# Patient Record
Sex: FEMALE | Race: BLACK OR AFRICAN AMERICAN | NOT HISPANIC OR LATINO | Employment: OTHER | URBAN - METROPOLITAN AREA
[De-identification: names, ages, dates, MRNs, and addresses within clinical notes are randomized per-mention and may not be internally consistent; named-entity substitution may affect disease eponyms.]

---

## 2019-09-23 ENCOUNTER — HOSPITAL ENCOUNTER (INPATIENT)
Facility: HOSPITAL | Age: 64
LOS: 3 days | Discharge: HOME | DRG: 436 | End: 2019-09-26
Attending: EMERGENCY MEDICINE | Admitting: HOSPITALIST
Payer: COMMERCIAL

## 2019-09-23 DIAGNOSIS — C22.0 HCC (HEPATOCELLULAR CARCINOMA): Primary | ICD-10-CM

## 2019-09-23 DIAGNOSIS — R18.0 MALIGNANT ASCITES: ICD-10-CM

## 2019-09-23 PROBLEM — E11.9 TYPE 2 DIABETES MELLITUS, WITHOUT LONG-TERM CURRENT USE OF INSULIN: Status: ACTIVE | Noted: 2019-09-23

## 2019-09-23 PROBLEM — R18.8 ASCITES: Status: ACTIVE | Noted: 2019-09-23

## 2019-09-23 PROBLEM — I10 HYPERTENSION: Status: ACTIVE | Noted: 2019-09-23

## 2019-09-23 LAB
ALBUMIN SERPL BCP-MCNC: 3.1 G/DL (ref 3.5–5.2)
ALP SERPL-CCNC: 288 U/L (ref 55–135)
ALT SERPL W/O P-5'-P-CCNC: 59 U/L (ref 10–44)
AMMONIA PLAS-SCNC: 41 UMOL/L (ref 10–50)
ANION GAP SERPL CALC-SCNC: 15 MMOL/L (ref 8–16)
ANISOCYTOSIS BLD QL SMEAR: ABNORMAL
APTT BLDCRRT: 21.8 SEC (ref 21–32)
AST SERPL-CCNC: 334 U/L (ref 10–40)
BACTERIA #/AREA URNS AUTO: ABNORMAL /HPF
BASOPHILS # BLD AUTO: 0.03 K/UL (ref 0–0.2)
BASOPHILS NFR BLD: 0.3 % (ref 0–1.9)
BILIRUB SERPL-MCNC: 13.8 MG/DL (ref 0.1–1)
BILIRUB UR QL STRIP: ABNORMAL
BUN SERPL-MCNC: 7 MG/DL (ref 8–23)
BURR CELLS BLD QL SMEAR: ABNORMAL
CALCIUM SERPL-MCNC: 9.3 MG/DL (ref 8.7–10.5)
CHLORIDE SERPL-SCNC: 101 MMOL/L (ref 95–110)
CLARITY UR REFRACT.AUTO: CLEAR
CO2 SERPL-SCNC: 23 MMOL/L (ref 23–29)
COLOR UR AUTO: ABNORMAL
CREAT SERPL-MCNC: 0.7 MG/DL (ref 0.5–1.4)
DACRYOCYTES BLD QL SMEAR: ABNORMAL
DIFFERENTIAL METHOD: ABNORMAL
EOSINOPHIL # BLD AUTO: 0.1 K/UL (ref 0–0.5)
EOSINOPHIL NFR BLD: 0.8 % (ref 0–8)
ERYTHROCYTE [DISTWIDTH] IN BLOOD BY AUTOMATED COUNT: 37.2 % (ref 11.5–14.5)
EST. GFR  (AFRICAN AMERICAN): >60 ML/MIN/1.73 M^2
EST. GFR  (NON AFRICAN AMERICAN): >60 ML/MIN/1.73 M^2
ESTIMATED AVG GLUCOSE: 77 MG/DL (ref 68–131)
ESTIMATED AVG GLUCOSE: 80 MG/DL (ref 68–131)
GLUCOSE SERPL-MCNC: 59 MG/DL (ref 70–110)
GLUCOSE UR QL STRIP: NEGATIVE
HBA1C MFR BLD HPLC: 4.3 % (ref 4–5.6)
HBA1C MFR BLD HPLC: 4.4 % (ref 4–5.6)
HCT VFR BLD AUTO: 27 % (ref 37–48.5)
HGB BLD-MCNC: 8 G/DL (ref 12–16)
HGB UR QL STRIP: NEGATIVE
HYALINE CASTS UR QL AUTO: 0 /LPF
HYPOCHROMIA BLD QL SMEAR: ABNORMAL
IMM GRANULOCYTES # BLD AUTO: 0.15 K/UL (ref 0–0.04)
IMM GRANULOCYTES NFR BLD AUTO: 1.5 % (ref 0–0.5)
INR PPP: 1.4 (ref 0.8–1.2)
KETONES UR QL STRIP: ABNORMAL
LEUKOCYTE ESTERASE UR QL STRIP: NEGATIVE
LIPASE SERPL-CCNC: 24 U/L (ref 4–60)
LYMPHOCYTES # BLD AUTO: 1.3 K/UL (ref 1–4.8)
LYMPHOCYTES NFR BLD: 12.9 % (ref 18–48)
MAGNESIUM SERPL-MCNC: 2.1 MG/DL (ref 1.6–2.6)
MCH RBC QN AUTO: 20.2 PG (ref 27–31)
MCHC RBC AUTO-ENTMCNC: 29.6 G/DL (ref 32–36)
MCV RBC AUTO: 68 FL (ref 82–98)
MICROSCOPIC COMMENT: ABNORMAL
MONOCYTES # BLD AUTO: 0.6 K/UL (ref 0.3–1)
MONOCYTES NFR BLD: 5.8 % (ref 4–15)
NEUTROPHILS # BLD AUTO: 7.7 K/UL (ref 1.8–7.7)
NEUTROPHILS NFR BLD: 78.7 % (ref 38–73)
NITRITE UR QL STRIP: NEGATIVE
NRBC BLD-RTO: 0 /100 WBC
PH UR STRIP: 5 [PH] (ref 5–8)
PLATELET # BLD AUTO: 529 K/UL (ref 150–350)
PLATELET BLD QL SMEAR: ABNORMAL
PMV BLD AUTO: ABNORMAL FL (ref 9.2–12.9)
POCT GLUCOSE: 60 MG/DL (ref 70–110)
POIKILOCYTOSIS BLD QL SMEAR: ABNORMAL
POLYCHROMASIA BLD QL SMEAR: ABNORMAL
POTASSIUM SERPL-SCNC: 3.4 MMOL/L (ref 3.5–5.1)
PROT SERPL-MCNC: 8.1 G/DL (ref 6–8.4)
PROT UR QL STRIP: ABNORMAL
PROTHROMBIN TIME: 14.3 SEC (ref 9–12.5)
RBC # BLD AUTO: 3.97 M/UL (ref 4–5.4)
RBC #/AREA URNS AUTO: 2 /HPF (ref 0–4)
SODIUM SERPL-SCNC: 139 MMOL/L (ref 136–145)
SODIUM UR-SCNC: 38 MMOL/L (ref 20–250)
SP GR UR STRIP: 1.02 (ref 1–1.03)
SQUAMOUS #/AREA URNS AUTO: 11 /HPF
TARGETS BLD QL SMEAR: ABNORMAL
URN SPEC COLLECT METH UR: ABNORMAL
WBC # BLD AUTO: 9.79 K/UL (ref 3.9–12.7)
WBC #/AREA URNS AUTO: 3 /HPF (ref 0–5)

## 2019-09-23 PROCEDURE — 85730 THROMBOPLASTIN TIME PARTIAL: CPT

## 2019-09-23 PROCEDURE — 63600175 PHARM REV CODE 636 W HCPCS: Performed by: STUDENT IN AN ORGANIZED HEALTH CARE EDUCATION/TRAINING PROGRAM

## 2019-09-23 PROCEDURE — 80053 COMPREHEN METABOLIC PANEL: CPT

## 2019-09-23 PROCEDURE — 99284 EMERGENCY DEPT VISIT MOD MDM: CPT | Mod: ,,, | Performed by: EMERGENCY MEDICINE

## 2019-09-23 PROCEDURE — 99284 PR EMERGENCY DEPT VISIT,LEVEL IV: ICD-10-PCS | Mod: ,,, | Performed by: EMERGENCY MEDICINE

## 2019-09-23 PROCEDURE — 83036 HEMOGLOBIN GLYCOSYLATED A1C: CPT | Mod: 91

## 2019-09-23 PROCEDURE — 99285 EMERGENCY DEPT VISIT HI MDM: CPT | Mod: 25

## 2019-09-23 PROCEDURE — 63600175 PHARM REV CODE 636 W HCPCS: Performed by: EMERGENCY MEDICINE

## 2019-09-23 PROCEDURE — 85025 COMPLETE CBC W/AUTO DIFF WBC: CPT

## 2019-09-23 PROCEDURE — 85610 PROTHROMBIN TIME: CPT

## 2019-09-23 PROCEDURE — 82140 ASSAY OF AMMONIA: CPT

## 2019-09-23 PROCEDURE — 12000002 HC ACUTE/MED SURGE SEMI-PRIVATE ROOM

## 2019-09-23 PROCEDURE — 81001 URINALYSIS AUTO W/SCOPE: CPT

## 2019-09-23 PROCEDURE — 83735 ASSAY OF MAGNESIUM: CPT

## 2019-09-23 PROCEDURE — 20600001 HC STEP DOWN PRIVATE ROOM

## 2019-09-23 PROCEDURE — 83690 ASSAY OF LIPASE: CPT

## 2019-09-23 PROCEDURE — 25000003 PHARM REV CODE 250: Performed by: STUDENT IN AN ORGANIZED HEALTH CARE EDUCATION/TRAINING PROGRAM

## 2019-09-23 PROCEDURE — 84300 ASSAY OF URINE SODIUM: CPT

## 2019-09-23 RX ORDER — INSULIN ASPART 100 [IU]/ML
0-5 INJECTION, SOLUTION INTRAVENOUS; SUBCUTANEOUS
Status: DISCONTINUED | OUTPATIENT
Start: 2019-09-23 | End: 2019-09-26 | Stop reason: HOSPADM

## 2019-09-23 RX ORDER — GLUCAGON 1 MG
1 KIT INJECTION
Status: DISCONTINUED | OUTPATIENT
Start: 2019-09-23 | End: 2019-09-26 | Stop reason: HOSPADM

## 2019-09-23 RX ORDER — MORPHINE SULFATE 4 MG/ML
4 INJECTION, SOLUTION INTRAMUSCULAR; INTRAVENOUS
Status: COMPLETED | OUTPATIENT
Start: 2019-09-23 | End: 2019-09-23

## 2019-09-23 RX ORDER — ONDANSETRON 2 MG/ML
4 INJECTION INTRAMUSCULAR; INTRAVENOUS
Status: COMPLETED | OUTPATIENT
Start: 2019-09-23 | End: 2019-09-23

## 2019-09-23 RX ORDER — MORPHINE SULFATE 2 MG/ML
2 INJECTION, SOLUTION INTRAMUSCULAR; INTRAVENOUS EVERY 4 HOURS PRN
Status: DISCONTINUED | OUTPATIENT
Start: 2019-09-23 | End: 2019-09-25

## 2019-09-23 RX ORDER — SODIUM CHLORIDE 0.9 % (FLUSH) 0.9 %
10 SYRINGE (ML) INJECTION
Status: DISCONTINUED | OUTPATIENT
Start: 2019-09-23 | End: 2019-09-26 | Stop reason: HOSPADM

## 2019-09-23 RX ORDER — SPIRONOLACTONE 25 MG/1
25 TABLET ORAL DAILY
Status: DISCONTINUED | OUTPATIENT
Start: 2019-09-23 | End: 2019-09-26 | Stop reason: HOSPADM

## 2019-09-23 RX ORDER — IBUPROFEN 200 MG
24 TABLET ORAL
Status: DISCONTINUED | OUTPATIENT
Start: 2019-09-23 | End: 2019-09-26 | Stop reason: HOSPADM

## 2019-09-23 RX ORDER — ONDANSETRON 2 MG/ML
4 INJECTION INTRAMUSCULAR; INTRAVENOUS EVERY 8 HOURS PRN
Status: DISCONTINUED | OUTPATIENT
Start: 2019-09-23 | End: 2019-09-26 | Stop reason: HOSPADM

## 2019-09-23 RX ORDER — FUROSEMIDE 10 MG/ML
40 INJECTION INTRAMUSCULAR; INTRAVENOUS ONCE
Status: COMPLETED | OUTPATIENT
Start: 2019-09-23 | End: 2019-09-23

## 2019-09-23 RX ORDER — IBUPROFEN 200 MG
16 TABLET ORAL
Status: DISCONTINUED | OUTPATIENT
Start: 2019-09-23 | End: 2019-09-26 | Stop reason: HOSPADM

## 2019-09-23 RX ORDER — CEFTRIAXONE 1 G/1
1 INJECTION, POWDER, FOR SOLUTION INTRAMUSCULAR; INTRAVENOUS
Status: COMPLETED | OUTPATIENT
Start: 2019-09-23 | End: 2019-09-23

## 2019-09-23 RX ORDER — ENOXAPARIN SODIUM 100 MG/ML
40 INJECTION SUBCUTANEOUS EVERY 24 HOURS
Status: DISCONTINUED | OUTPATIENT
Start: 2019-09-24 | End: 2019-09-26 | Stop reason: HOSPADM

## 2019-09-23 RX ORDER — CEFTRIAXONE 1 G/1
1 INJECTION, POWDER, FOR SOLUTION INTRAMUSCULAR; INTRAVENOUS
Status: DISCONTINUED | OUTPATIENT
Start: 2019-09-24 | End: 2019-09-24

## 2019-09-23 RX ORDER — PROCHLORPERAZINE EDISYLATE 5 MG/ML
10 INJECTION INTRAMUSCULAR; INTRAVENOUS EVERY 6 HOURS PRN
Status: DISCONTINUED | OUTPATIENT
Start: 2019-09-23 | End: 2019-09-26 | Stop reason: HOSPADM

## 2019-09-23 RX ADMIN — CEFTRIAXONE SODIUM 1 G: 1 INJECTION, POWDER, FOR SOLUTION INTRAMUSCULAR; INTRAVENOUS at 08:09

## 2019-09-23 RX ADMIN — FUROSEMIDE 40 MG: 10 INJECTION, SOLUTION INTRAMUSCULAR; INTRAVENOUS at 10:09

## 2019-09-23 RX ADMIN — SPIRONOLACTONE 25 MG: 25 TABLET, FILM COATED ORAL at 10:09

## 2019-09-23 RX ADMIN — MORPHINE SULFATE 4 MG: 4 INJECTION INTRAVENOUS at 08:09

## 2019-09-23 RX ADMIN — DEXTROSE 125 ML: 10 SOLUTION INTRAVENOUS at 09:09

## 2019-09-23 RX ADMIN — ONDANSETRON 4 MG: 2 INJECTION INTRAMUSCULAR; INTRAVENOUS at 08:09

## 2019-09-23 NOTE — ED TRIAGE NOTES
Pt presents with c/o abdominal pain and distention, pt is jaundice. Pt niece reports she saw her last Tuesday and her abdomen was not this distended.

## 2019-09-23 NOTE — ED PROVIDER NOTES
"Encounter Date: 9/23/2019       History     Chief Complaint   Patient presents with    Abdominal Pain     just found out liver cancer 2 months ago,  abd hard     Patient is a 64-year-old female with past medical history of HTN, DM2, hepatocellular carcinoma who presents with abdominal discomfort and abdominal distention over the past week.  Patient reports she is not really having any abdominal pain is just more "uncomfortable."  She reports discomfort with taking deep breaths and moving around.  Her niece who is at bedside reports patient did not have such significant abdominal swelling until this past week.  Patient is currently getting hospice care at home but reports "they are not helping me with this."  She was supposed to have medicine prescribed to help with fluid in her abdomen but did not receive it. Patient is currently in town visiting family.  She has been getting her care previously at [facility] in .  Has seen oncology who has no plans for chemo or radiation given extent of disease    The history is provided by the patient and a caregiver. History limited by: poor historian.     Review of patient's allergies indicates:  No Known Allergies  Past Medical History:   Diagnosis Date    Diabetes mellitus     Hepatocellular carcinoma 08/2019    Hypertension      History reviewed. No pertinent surgical history.  History reviewed. No pertinent family history.  Social History     Tobacco Use    Smoking status: Former Smoker    Smokeless tobacco: Never Used   Substance Use Topics    Alcohol use: Not Currently    Drug use: Not Currently     Review of Systems   Constitutional: Positive for fatigue. Negative for fever.   HENT: Negative.    Eyes: Negative for visual disturbance.   Respiratory: Positive for shortness of breath.    Cardiovascular: Positive for leg swelling. Negative for chest pain and palpitations.   Gastrointestinal: Positive for abdominal distention. Negative for diarrhea and " vomiting.   Endocrine: Negative for polydipsia and polyuria.   Genitourinary: Negative for difficulty urinating and dysuria.   Musculoskeletal: Positive for back pain.   Skin: Negative for rash.   Allergic/Immunologic: Negative for immunocompromised state.   Neurological: Positive for weakness. Negative for headaches.   Hematological: Does not bruise/bleed easily.   Psychiatric/Behavioral: Negative for behavioral problems.   All other systems reviewed and are negative.      Physical Exam     Initial Vitals [09/23/19 1643]   BP Pulse Resp Temp SpO2   (!) 148/67 83 18 98.9 °F (37.2 °C) 98 %      MAP       --         Physical Exam    Nursing note and vitals reviewed.  Constitutional: She appears well-developed and well-nourished.   HENT:   Head: Normocephalic and atraumatic.   Eyes: EOM are normal.   Neck: Neck supple.   Cardiovascular: Normal rate and regular rhythm.   Pulmonary/Chest: No respiratory distress.   Abdominal: She exhibits distension. There is tenderness.   Musculoskeletal: Normal range of motion.   Neurological: She is alert and oriented to person, place, and time. No cranial nerve deficit.   Skin: Skin is warm and dry.   Psychiatric: She has a normal mood and affect.         ED Course   Procedures  Labs Reviewed   COMPREHENSIVE METABOLIC PANEL - Abnormal; Notable for the following components:       Result Value    Potassium 3.4 (*)     Glucose 59 (*)     BUN, Bld 7 (*)     Albumin 3.1 (*)     Total Bilirubin 13.8 (*)     Alkaline Phosphatase 288 (*)      (*)     ALT 59 (*)     All other components within normal limits   CBC W/ AUTO DIFFERENTIAL - Abnormal; Notable for the following components:    RBC 3.97 (*)     Hemoglobin 8.0 (*)     Hematocrit 27.0 (*)     Mean Corpuscular Volume 68 (*)     Mean Corpuscular Hemoglobin 20.2 (*)     Mean Corpuscular Hemoglobin Conc 29.6 (*)     RDW 37.2 (*)     Platelets 529 (*)     Immature Granulocytes 1.5 (*)     Immature Grans (Abs) 0.15 (*)     Gran% 78.7  (*)     Lymph% 12.9 (*)     Platelet Estimate Increased (*)     All other components within normal limits   URINALYSIS, REFLEX TO URINE CULTURE - Abnormal; Notable for the following components:    Protein, UA 1+ (*)     Ketones, UA 1+ (*)     Bilirubin (UA) 2+ (*)     All other components within normal limits    Narrative:     Preferred Collection Type->Urine, Clean Catch   PROTIME-INR - Abnormal; Notable for the following components:    Prothrombin Time 14.3 (*)     INR 1.4 (*)     All other components within normal limits   URINALYSIS MICROSCOPIC - Abnormal; Notable for the following components:    Bacteria Few (*)     All other components within normal limits    Narrative:     Preferred Collection Type->Urine, Clean Catch   POCT GLUCOSE - Abnormal; Notable for the following components:    POCT Glucose 60 (*)     All other components within normal limits   LIPASE   MAGNESIUM   APTT   AMMONIA   HEMOGLOBIN A1C   SODIUM, URINE, RANDOM   SODIUM, URINE, RANDOM    Narrative:     Preferred Collection Type->Urine, Clean Catch  ADD ON SODIUM, URINE, RANDOM [id number] PER TY MORAN MD   22:02  09/23/2019    HEMOGLOBIN A1C   HEMOGLOBIN A1C    Narrative:     ADD ON HEMOGLOBIN A1C [id number] PER TY MORAN MD 22:07    09/23/2019    HEMOGLOBIN A1C    Narrative:     add on GHGB #[id number] per Ty Moran MD @ 22:22  09/23/2019    POCT GLUCOSE, HAND-HELD DEVICE          Imaging Results           US Liver with Doppler (xpd) (Final result)  Result time 09/24/19 09:35:01    Final result by Socrates Claire MD (09/24/19 09:35:01)                 Impression:      Mass in the left hepatic lobe measuring 8.8 x 7.2 x 4.9 cm.    There is clot in the left portal vein with reversal of flow.  There is partial clot in the right portal vein and main portal vein with diminished flow.    Hepatomegaly.    Mild volume ascites.    Gallbladder sludge.    This report was flagged in Epic as abnormal.    Electronically signed  by resident: Sandeep Garcia  Date:    09/24/2019  Time:    07:49    Electronically signed by: Socrates Claire MD  Date:    09/24/2019  Time:    09:35             Narrative:    EXAMINATION:  US LIVER WITH DOPPLER    CLINICAL HISTORY:  HCC, decompensated liver failure;    TECHNIQUE:  Complete abdominal ultrasound with doppler.  Color and spectral Doppler were performed.    COMPARISON:  No priors available.    FINDINGS:  The visualized portion of the pancreas is unremarkable.    The liver is enlarged in size, measuring 21 cm.  The liver demonstrates heterogeneous echotexture.  There is a mass in the left hepatic lobe measuring 8.8 x 7.2 x 4.9 cm.    The gallbladder contains sludge with no evidence of calculi.  The common duct is not dilated, measuring 3 mm.  The gallbladder wall is not thickened.  There is no sonographic Lyles sign.  No dilated intrahepatic radicles are seen.    The spleen is normal in size measuring 10.5 x 4.0 cm with a homogeneous echotexture.    There is mild volume ascites.    There is clot in the left portal vein with reversal of flow.  There is partial clot in the right portal vein and main portal vein with diminished flow.  Main portal vein measures 10 mm in diameter.    The middle hepatic vein, right hepatic vein, left hepatic vein, and IVC are patent with proper directional flow.  The main hepatic artery is patent. The umbilical vein is not patent.                               X-Ray Abdomen Portable (Final result)  Result time 09/23/19 22:17:37    Final result by Rio Art MD (09/23/19 22:17:37)                 Impression:      Normal bowel gas pattern.      Electronically signed by: Rio Art  Date:    09/23/2019  Time:    22:17             Narrative:    EXAMINATION:  XR ABDOMEN PORTABLE    CLINICAL HISTORY:  constipation + ascites, ?SBO;    TECHNIQUE:  Single frontal supine view of the abdomen    COMPARISON:  None    FINDINGS:  Bowel gas pattern appears normal.  No  pneumatosis or free air or calculus or fracture or hernia seen.  There are overlying leads in catheters.  There is minimal formed stool of the left colon.                               X-Ray Chest AP Portable (Final result)  Result time 09/23/19 22:01:22    Final result by Rio Art MD (09/23/19 22:01:22)                 Impression:      Mild edema.      Electronically signed by: Rio Art  Date:    09/23/2019  Time:    22:01             Narrative:    EXAMINATION:  XR CHEST AP PORTABLE    CLINICAL HISTORY:  ascites;    TECHNIQUE:  Single frontal view of the chest was performed.    COMPARISON:  None    FINDINGS:  Single AP portable view at 21:52.    The heart is upper limits of normal size.  There is mild interstitial edema.  No pneumothorax or discrete pleural effusion.  No lobar consolidation or nodule.  Trachea appears normal.  No abdominal free air.  There are overlying leads.                                 Medical Decision Making:   History:   Old Medical Records: I decided to obtain old medical records.  Old Records Summarized: other records.       <> Summary of Records:     Differential Diagnosis:   Portal vein thrombosis, hcc with mass effect, cholelithiasis or cholecystitis, SBP  ED Management:  Pt would like to revoke hospice at this time bc she has not been comfortable at home  Will admit for further eval- us liver with doppler, possible therapeutic paracentesis with drainage placement if pt wanting to remain on hospice  Although low suspicion for sbp, went ahead and started rocephin since pt does have some discomfort to her abdomen  Likely just due to pressure from ascites but no fever no leukocytosis                   ED Course as of Sep 25 1432   Mon Sep 23, 2019   2011 Was 9.5 on 8/7/19   Hemoglobin(!): 8.0 [GK]      ED Course User Index  [GK] Tatum Lee MD     Clinical Impression:       ICD-10-CM ICD-9-CM   1. HCC (hepatocellular carcinoma) C22.0 155.0   2. Malignant ascites  R18.0 789.51                                Tatum Lee MD  09/25/19 6907

## 2019-09-23 NOTE — ED NOTES
Pt is on cardiac monitor, bp cuff and continuous pulse ox. Call light within reach, niece at bedside. Will continue to monitor.

## 2019-09-24 LAB
ALBUMIN FLD-MCNC: 0.7 G/DL
ALBUMIN SERPL BCP-MCNC: 2.7 G/DL (ref 3.5–5.2)
ALP SERPL-CCNC: 259 U/L (ref 55–135)
ALT SERPL W/O P-5'-P-CCNC: 50 U/L (ref 10–44)
ANION GAP SERPL CALC-SCNC: 15 MMOL/L (ref 8–16)
ANISOCYTOSIS BLD QL SMEAR: SLIGHT
ANISOCYTOSIS BLD QL SMEAR: SLIGHT
APPEARANCE FLD: CLEAR
AST SERPL-CCNC: 301 U/L (ref 10–40)
BASO STIPL BLD QL SMEAR: ABNORMAL
BASO STIPL BLD QL SMEAR: ABNORMAL
BASOPHILS # BLD AUTO: 0.04 K/UL (ref 0–0.2)
BASOPHILS # BLD AUTO: 0.04 K/UL (ref 0–0.2)
BASOPHILS NFR BLD: 0.5 % (ref 0–1.9)
BASOPHILS NFR BLD: 0.5 % (ref 0–1.9)
BILIRUB SERPL-MCNC: 12 MG/DL (ref 0.1–1)
BODY FLD TYPE: NORMAL
BODY FLUID SOURCE, LDH: NORMAL
BUN SERPL-MCNC: 7 MG/DL (ref 8–23)
CALCIUM SERPL-MCNC: 9.1 MG/DL (ref 8.7–10.5)
CHLORIDE SERPL-SCNC: 101 MMOL/L (ref 95–110)
CO2 SERPL-SCNC: 23 MMOL/L (ref 23–29)
COLOR FLD: YELLOW
CREAT SERPL-MCNC: 0.7 MG/DL (ref 0.5–1.4)
DIFFERENTIAL METHOD: ABNORMAL
DIFFERENTIAL METHOD: ABNORMAL
EOSINOPHIL # BLD AUTO: 0.1 K/UL (ref 0–0.5)
EOSINOPHIL # BLD AUTO: 0.1 K/UL (ref 0–0.5)
EOSINOPHIL NFR BLD: 1.2 % (ref 0–8)
EOSINOPHIL NFR BLD: 1.2 % (ref 0–8)
ERYTHROCYTE [DISTWIDTH] IN BLOOD BY AUTOMATED COUNT: 37 % (ref 11.5–14.5)
ERYTHROCYTE [DISTWIDTH] IN BLOOD BY AUTOMATED COUNT: 37.2 % (ref 11.5–14.5)
EST. GFR  (AFRICAN AMERICAN): >60 ML/MIN/1.73 M^2
EST. GFR  (NON AFRICAN AMERICAN): >60 ML/MIN/1.73 M^2
GIANT PLATELETS BLD QL SMEAR: PRESENT
GIANT PLATELETS BLD QL SMEAR: PRESENT
GLUCOSE SERPL-MCNC: 57 MG/DL (ref 70–110)
GRAM STN SPEC: NORMAL
GRAM STN SPEC: NORMAL
HCT VFR BLD AUTO: 24 % (ref 37–48.5)
HCT VFR BLD AUTO: 24.4 % (ref 37–48.5)
HGB BLD-MCNC: 7.3 G/DL (ref 12–16)
HGB BLD-MCNC: 7.4 G/DL (ref 12–16)
HYPOCHROMIA BLD QL SMEAR: ABNORMAL
HYPOCHROMIA BLD QL SMEAR: ABNORMAL
IMM GRANULOCYTES # BLD AUTO: 0.09 K/UL (ref 0–0.04)
IMM GRANULOCYTES # BLD AUTO: 0.11 K/UL (ref 0–0.04)
IMM GRANULOCYTES NFR BLD AUTO: 1.1 % (ref 0–0.5)
IMM GRANULOCYTES NFR BLD AUTO: 1.3 % (ref 0–0.5)
INR PPP: 1.5 (ref 0.8–1.2)
LDH FLD L TO P-CCNC: 74 U/L
LYMPHOCYTES # BLD AUTO: 1.5 K/UL (ref 1–4.8)
LYMPHOCYTES # BLD AUTO: 1.5 K/UL (ref 1–4.8)
LYMPHOCYTES NFR BLD: 18.6 % (ref 18–48)
LYMPHOCYTES NFR BLD: 18.8 % (ref 18–48)
LYMPHOCYTES NFR FLD MANUAL: 47 %
MAGNESIUM SERPL-MCNC: 1.8 MG/DL (ref 1.6–2.6)
MCH RBC QN AUTO: 20.1 PG (ref 27–31)
MCH RBC QN AUTO: 20.1 PG (ref 27–31)
MCHC RBC AUTO-ENTMCNC: 30.3 G/DL (ref 32–36)
MCHC RBC AUTO-ENTMCNC: 30.4 G/DL (ref 32–36)
MCV RBC AUTO: 66 FL (ref 82–98)
MCV RBC AUTO: 66 FL (ref 82–98)
MESOTHL CELL NFR FLD MANUAL: 5 %
MONOCYTES # BLD AUTO: 0.6 K/UL (ref 0.3–1)
MONOCYTES # BLD AUTO: 0.6 K/UL (ref 0.3–1)
MONOCYTES NFR BLD: 7.6 % (ref 4–15)
MONOCYTES NFR BLD: 7.8 % (ref 4–15)
MONOS+MACROS NFR FLD MANUAL: 41 %
NEUTROPHILS # BLD AUTO: 5.8 K/UL (ref 1.8–7.7)
NEUTROPHILS # BLD AUTO: 5.9 K/UL (ref 1.8–7.7)
NEUTROPHILS NFR BLD: 70.6 % (ref 38–73)
NEUTROPHILS NFR BLD: 70.8 % (ref 38–73)
NEUTROPHILS NFR FLD MANUAL: 7 %
NRBC BLD-RTO: 0 /100 WBC
NRBC BLD-RTO: 0 /100 WBC
OVALOCYTES BLD QL SMEAR: ABNORMAL
OVALOCYTES BLD QL SMEAR: ABNORMAL
PHOSPHATE SERPL-MCNC: 1.5 MG/DL (ref 2.7–4.5)
PLATELET # BLD AUTO: 460 K/UL (ref 150–350)
PLATELET # BLD AUTO: 466 K/UL (ref 150–350)
PLATELET BLD QL SMEAR: ABNORMAL
PLATELET BLD QL SMEAR: ABNORMAL
PMV BLD AUTO: ABNORMAL FL (ref 9.2–12.9)
PMV BLD AUTO: ABNORMAL FL (ref 9.2–12.9)
POCT GLUCOSE: 113 MG/DL (ref 70–110)
POCT GLUCOSE: 116 MG/DL (ref 70–110)
POCT GLUCOSE: 146 MG/DL (ref 70–110)
POCT GLUCOSE: 54 MG/DL (ref 70–110)
POCT GLUCOSE: 57 MG/DL (ref 70–110)
POCT GLUCOSE: 85 MG/DL (ref 70–110)
POCT GLUCOSE: 90 MG/DL (ref 70–110)
POIKILOCYTOSIS BLD QL SMEAR: ABNORMAL
POIKILOCYTOSIS BLD QL SMEAR: ABNORMAL
POLYCHROMASIA BLD QL SMEAR: ABNORMAL
POLYCHROMASIA BLD QL SMEAR: ABNORMAL
POTASSIUM SERPL-SCNC: 3.4 MMOL/L (ref 3.5–5.1)
PROT FLD-MCNC: 1.3 G/DL
PROT SERPL-MCNC: 7.1 G/DL (ref 6–8.4)
PROTHROMBIN TIME: 14.7 SEC (ref 9–12.5)
RBC # BLD AUTO: 3.63 M/UL (ref 4–5.4)
RBC # BLD AUTO: 3.69 M/UL (ref 4–5.4)
SCHISTOCYTES BLD QL SMEAR: ABNORMAL
SCHISTOCYTES BLD QL SMEAR: ABNORMAL
SODIUM SERPL-SCNC: 139 MMOL/L (ref 136–145)
SPECIMEN SOURCE: NORMAL
SPECIMEN SOURCE: NORMAL
TARGETS BLD QL SMEAR: ABNORMAL
TARGETS BLD QL SMEAR: ABNORMAL
WBC # BLD AUTO: 8.2 K/UL (ref 3.9–12.7)
WBC # BLD AUTO: 8.29 K/UL (ref 3.9–12.7)
WBC # FLD: 309 /CU MM

## 2019-09-24 PROCEDURE — 97161 PT EVAL LOW COMPLEX 20 MIN: CPT

## 2019-09-24 PROCEDURE — 83615 LACTATE (LD) (LDH) ENZYME: CPT

## 2019-09-24 PROCEDURE — 87075 CULTR BACTERIA EXCEPT BLOOD: CPT

## 2019-09-24 PROCEDURE — 25000003 PHARM REV CODE 250: Performed by: STUDENT IN AN ORGANIZED HEALTH CARE EDUCATION/TRAINING PROGRAM

## 2019-09-24 PROCEDURE — 20600001 HC STEP DOWN PRIVATE ROOM

## 2019-09-24 PROCEDURE — 99223 PR INITIAL HOSPITAL CARE,LEVL III: ICD-10-PCS | Mod: ,,, | Performed by: HOSPITALIST

## 2019-09-24 PROCEDURE — 84157 ASSAY OF PROTEIN OTHER: CPT

## 2019-09-24 PROCEDURE — 84100 ASSAY OF PHOSPHORUS: CPT

## 2019-09-24 PROCEDURE — 87205 SMEAR GRAM STAIN: CPT

## 2019-09-24 PROCEDURE — 89051 BODY FLUID CELL COUNT: CPT

## 2019-09-24 PROCEDURE — 63600175 PHARM REV CODE 636 W HCPCS: Performed by: STUDENT IN AN ORGANIZED HEALTH CARE EDUCATION/TRAINING PROGRAM

## 2019-09-24 PROCEDURE — 87070 CULTURE OTHR SPECIMN AEROBIC: CPT

## 2019-09-24 PROCEDURE — 97166 OT EVAL MOD COMPLEX 45 MIN: CPT

## 2019-09-24 PROCEDURE — 83735 ASSAY OF MAGNESIUM: CPT

## 2019-09-24 PROCEDURE — 80053 COMPREHEN METABOLIC PANEL: CPT

## 2019-09-24 PROCEDURE — 82042 OTHER SOURCE ALBUMIN QUAN EA: CPT

## 2019-09-24 PROCEDURE — 99223 1ST HOSP IP/OBS HIGH 75: CPT | Mod: ,,, | Performed by: HOSPITALIST

## 2019-09-24 PROCEDURE — 85610 PROTHROMBIN TIME: CPT

## 2019-09-24 PROCEDURE — 85025 COMPLETE CBC W/AUTO DIFF WBC: CPT | Mod: 91

## 2019-09-24 RX ORDER — POTASSIUM CHLORIDE 20 MEQ/1
40 TABLET, EXTENDED RELEASE ORAL 2 TIMES DAILY
Status: COMPLETED | OUTPATIENT
Start: 2019-09-24 | End: 2019-09-24

## 2019-09-24 RX ORDER — SPIRONOLACTONE 100 MG/1
100 TABLET, FILM COATED ORAL DAILY
COMMUNITY

## 2019-09-24 RX ORDER — OLANZAPINE 5 MG/1
5 TABLET ORAL NIGHTLY
COMMUNITY

## 2019-09-24 RX ORDER — LIDOCAINE HYDROCHLORIDE 10 MG/ML
10 INJECTION INFILTRATION; PERINEURAL ONCE
Status: COMPLETED | OUTPATIENT
Start: 2019-09-24 | End: 2019-09-24

## 2019-09-24 RX ORDER — HYDROCORTISONE 25 MG/G
CREAM TOPICAL 2 TIMES DAILY PRN
COMMUNITY

## 2019-09-24 RX ORDER — LACTULOSE 10 G/15ML
20 SOLUTION ORAL
Status: DISPENSED | OUTPATIENT
Start: 2019-09-24 | End: 2019-09-24

## 2019-09-24 RX ORDER — OXYCODONE HYDROCHLORIDE 10 MG/1
10 TABLET ORAL EVERY 4 HOURS PRN
Status: ON HOLD | COMMUNITY
End: 2019-09-26 | Stop reason: SDUPTHER

## 2019-09-24 RX ORDER — PROCHLORPERAZINE MALEATE 10 MG
10 TABLET ORAL EVERY 6 HOURS PRN
Status: ON HOLD | COMMUNITY
End: 2019-09-26 | Stop reason: HOSPADM

## 2019-09-24 RX ORDER — BISACODYL 10 MG
10 SUPPOSITORY, RECTAL RECTAL DAILY PRN
Status: DISCONTINUED | OUTPATIENT
Start: 2019-09-24 | End: 2019-09-26 | Stop reason: HOSPADM

## 2019-09-24 RX ORDER — DEXAMETHASONE 2 MG/1
2 TABLET ORAL 2 TIMES DAILY
COMMUNITY

## 2019-09-24 RX ORDER — LACTULOSE 10 G/15ML
20 SOLUTION ORAL 2 TIMES DAILY PRN
COMMUNITY

## 2019-09-24 RX ORDER — FENTANYL 50 UG/1
1 PATCH TRANSDERMAL
Status: ON HOLD | COMMUNITY
End: 2019-09-26 | Stop reason: HOSPADM

## 2019-09-24 RX ORDER — ONDANSETRON 4 MG/1
4 TABLET, FILM COATED ORAL EVERY 8 HOURS
COMMUNITY

## 2019-09-24 RX ORDER — SODIUM,POTASSIUM PHOSPHATES 280-250MG
1 POWDER IN PACKET (EA) ORAL
Status: DISCONTINUED | OUTPATIENT
Start: 2019-09-24 | End: 2019-09-25

## 2019-09-24 RX ORDER — POLYETHYLENE GLYCOL 3350 17 G/17G
17 POWDER, FOR SOLUTION ORAL DAILY
Status: ON HOLD | COMMUNITY
End: 2019-09-26 | Stop reason: HOSPADM

## 2019-09-24 RX ORDER — FUROSEMIDE 40 MG/1
40 TABLET ORAL DAILY
COMMUNITY

## 2019-09-24 RX ORDER — HYDROXYZINE PAMOATE 25 MG/1
25-50 CAPSULE ORAL EVERY 6 HOURS PRN
COMMUNITY

## 2019-09-24 RX ORDER — OXYCODONE HYDROCHLORIDE 5 MG/1
15 TABLET ORAL ONCE
Status: COMPLETED | OUTPATIENT
Start: 2019-09-24 | End: 2019-09-24

## 2019-09-24 RX ADMIN — Medication 16 G: at 08:09

## 2019-09-24 RX ADMIN — POTASSIUM CHLORIDE 40 MEQ: 20 TABLET, EXTENDED RELEASE ORAL at 03:09

## 2019-09-24 RX ADMIN — MORPHINE SULFATE 2 MG: 2 INJECTION, SOLUTION INTRAMUSCULAR; INTRAVENOUS at 10:09

## 2019-09-24 RX ADMIN — MORPHINE SULFATE 2 MG: 2 INJECTION, SOLUTION INTRAMUSCULAR; INTRAVENOUS at 12:09

## 2019-09-24 RX ADMIN — PROCHLORPERAZINE EDISYLATE 10 MG: 5 INJECTION INTRAMUSCULAR; INTRAVENOUS at 04:09

## 2019-09-24 RX ADMIN — SPIRONOLACTONE 25 MG: 25 TABLET, FILM COATED ORAL at 08:09

## 2019-09-24 RX ADMIN — ENOXAPARIN SODIUM 40 MG: 100 INJECTION SUBCUTANEOUS at 05:09

## 2019-09-24 RX ADMIN — OXYCODONE HYDROCHLORIDE 15 MG: 5 TABLET ORAL at 10:09

## 2019-09-24 RX ADMIN — LIDOCAINE HYDROCHLORIDE 10 ML: 10 INJECTION, SOLUTION INFILTRATION; PERINEURAL at 11:09

## 2019-09-24 RX ADMIN — ONDANSETRON 4 MG: 2 INJECTION INTRAMUSCULAR; INTRAVENOUS at 09:09

## 2019-09-24 RX ADMIN — MORPHINE SULFATE 2 MG: 2 INJECTION, SOLUTION INTRAMUSCULAR; INTRAVENOUS at 09:09

## 2019-09-24 RX ADMIN — POTASSIUM CHLORIDE 40 MEQ: 20 TABLET, EXTENDED RELEASE ORAL at 08:09

## 2019-09-24 RX ADMIN — CEFTRIAXONE SODIUM 1 G: 1 INJECTION, POWDER, FOR SOLUTION INTRAMUSCULAR; INTRAVENOUS at 08:09

## 2019-09-24 RX ADMIN — POTASSIUM & SODIUM PHOSPHATES POWDER PACK 280-160-250 MG 1 PACKET: 280-160-250 PACK at 09:09

## 2019-09-24 RX ADMIN — LACTULOSE 20 G: 20 SOLUTION ORAL at 10:09

## 2019-09-24 RX ADMIN — POTASSIUM & SODIUM PHOSPHATES POWDER PACK 280-160-250 MG 1 PACKET: 280-160-250 PACK at 05:09

## 2019-09-24 RX ADMIN — PROCHLORPERAZINE EDISYLATE 10 MG: 5 INJECTION INTRAMUSCULAR; INTRAVENOUS at 12:09

## 2019-09-24 RX ADMIN — LACTULOSE 20 G: 20 SOLUTION ORAL at 12:09

## 2019-09-24 NOTE — HOSPITAL COURSE
The patient is a 63 yo F with HTN, Type II DM (not requiring insulin), and a recent diagnosis of HCC in August of 2019 who is presenting with new onset ascites. While inpatient a diagnostic/theraputic paracentesis was performed with 2.1 L of serous fluid removed on 09/24. SBP workup was negative, however the patient reaccumulated within 24 hours with worsening abdominal pain. Heptatology was consulted. Per chart review from the outside hospital, the patient has possible mets to her hip from her HCC which was biopsy confirmed. IR was consulted for possible PleurX placement. Limited US was performed and did not show a pocket of fluid large enough to safely insert a PleurX at this time. Patient's pain and nausea/vomiting controlled with plans for discharge and close follow up with outpatient palliative care and IR. Patient refused hospice and home health as she is not interested in their services and recently fired her home hospice service with [facility].

## 2019-09-24 NOTE — H&P
[facility] Hospital Medicine  History & Physical    Patient Name: Anali Sidhu  MRN: [id number]  Admission Date: 9/23/2019  Attending Physician: Nora Agosto MD   Primary Care Provider: No primary care provider on file.    [facility] Medicine Team: [facility] 5 Larissa Moran MD     Patient information was obtained from patient, relative(s), past medical records and ER records.     Subjective:     Principal Problem:Ascites    Chief Complaint:   Chief Complaint   Patient presents with    Abdominal Pain     just found out liver cancer 2 months ago,  abd hard        HPI: Ms Soto is a 64 year old female with HTN, NIDDM2 and a recent diagnosis of HCC diagnosed in August 2019, who transitioned to hospice with [facility] presenting for abdominal distension and discomfort. Patient reports that over the past week she has had rapid distension of her abdomen, with mild abdominal pain described as a fullness, and constipation. Patient presents with her niece who states she saw patient last week and her abdomen was not this full. Patient reports that she was to get additional medications to help with her abdomen, but they never arrived due to never being sent by the hospice company. Patient is a poor historian otherwise.     Much of her workup had been completed at [facility] in , by palliative on 8/29. She states that she would not like to return to hospice at this time, as she states she understood it was to make her comfortable, but she has not been feeling that way. Patient is open to further conversations with palliative care in the future.     Past Medical History:   Diagnosis Date    Diabetes mellitus     Hepatocellular carcinoma 08/2019    Hypertension        History reviewed. No pertinent surgical history.    Review of patient's allergies indicates:  No Known Allergies    No current facility-administered medications on file prior to encounter.      No current  "outpatient medications on file prior to encounter.     Family History     None        Tobacco Use    Smoking status: Former Smoker    Smokeless tobacco: Never Used   Substance and Sexual Activity    Alcohol use: Not Currently    Drug use: Not Currently    Sexual activity: Not on file     Review of Systems   Constitutional: Negative for chills, diaphoresis, fatigue and fever.   Eyes: Negative.    Respiratory: Negative for cough and shortness of breath.    Cardiovascular: Negative for chest pain and leg swelling.   Gastrointestinal: Positive for abdominal distention, abdominal pain ("discomfort"), constipation and nausea (after morphine).   Genitourinary: Negative.    Musculoskeletal: Negative.    Neurological: Negative.    Psychiatric/Behavioral: Negative.      Objective:     Vital Signs (Most Recent):  Temp: 99.2 °F (37.3 °C) (09/23/19 1851)  Pulse: 88 (09/23/19 2026)  Resp: 16 (09/23/19 2026)  BP: (!) 163/77 (09/23/19 2026)  SpO2: 99 % (09/23/19 1851) Vital Signs (24h Range):  Temp:  [98.9 °F (37.2 °C)-99.2 °F (37.3 °C)] 99.2 °F (37.3 °C)  Pulse:  [81-88] 88  Resp:  [16-18] 16  SpO2:  [98 %-99 %] 99 %  BP: (148-185)/(67-77) 163/77     Weight: 71.2 kg (157 lb)  Body mass index is 26.95 kg/m².    Physical Exam   Constitutional: She is oriented to person, place, and time. She appears well-developed.   Uncomfortable appearing older woman   HENT:   Head: Normocephalic and atraumatic.   Eyes: Pupils are equal, round, and reactive to light. Scleral icterus is present.   Cardiovascular: Normal rate, regular rhythm and normal heart sounds.   Pulmonary/Chest: Effort normal. No respiratory distress. She has rales (bibasilar).   Tachypnea, shallow breaths   Abdominal: She exhibits distension. She exhibits no mass. There is no tenderness. There is no guarding.   Tense abdomen   Musculoskeletal: She exhibits edema (trace, bilateral LE).   Neurological: She is alert and oriented to person, place, and time.   Skin: Capillary " refill takes 2 to 3 seconds. She is not diaphoretic.   Psychiatric: She has a normal mood and affect. Her behavior is normal.   Nursing note and vitals reviewed.        CRANIAL NERVES     CN III, IV, VI   Pupils are equal, round, and reactive to light.     Significant Labs: All pertinent labs within the past 24 hours have been reviewed.    Significant Imaging: I have reviewed all pertinent imaging results/findings within the past 24 hours.    Assessment/Plan:     * Ascites  Patient with new onset ascites, recent HCC diagnosis 8/2019. Reports rapid accumulation of ascites over 1 week's time.     PLAN  - trial IV 40mg furosemide once. Assess output and schedule/titrate as necessary  - start spironolactone, titrate upwards as needed  - will likely need therapeutic paracentesis  - 1g ceftriaxone daily for SBP prophylaxis  - NPO at this time, will need diabetic/salt restricted diet      HCC (hepatocellular carcinoma)  Patient diagnosed with R and L lobe hepatocellular carcinoma in August 2019 and was told she is not a candidate for palliative chemotherapy. Patient's care was at [facility]. Rescinded hospice to present 09/23/2019.    Patient rescinded hospice ([facility]) to present to hospital. Palliative medicine consulted, call in AM. Patient stated she does understand hospice, but did not get the care for comfort as she expected.     Type 2 diabetes mellitus, without long-term current use of insulin  Patient prescribed metformin prior to entering hospice.     - order A1C  - place on LDSS  - NPO at this time, will need diabetic/salt restricted diet      Hypertension  Patient on no medications as was on hospice.     Will start on furosemide and spironolactone. Monitor for need for additional antihypertensives.       VTE Risk Mitigation (From admission, onward)         Ordered     enoxaparin injection 40 mg  Daily      09/23/19 2134     IP VTE HIGH RISK PATIENT  Once      09/23/19 2134                    Larissa Moran MD  Department of Hospital Medicine   [facility]

## 2019-09-24 NOTE — PT/OT/SLP EVAL
Physical Therapy Evaluation and Discharge Note    Patient Name:  Anali Sidhu   MRN:  [id number]    Recommendations:     Discharge Recommendations:  home   Discharge Equipment Recommendations: none   Barriers to discharge: Decreased caregiver support    Assessment:     Anali Sidhu is a 64 y.o. female admitted with a medical diagnosis of Ascites. Pt safe to ambulate in halls with Supervision.  At this time, patient is functioning at their prior level of function and does not require further acute PT services.     Recent Surgery: * No surgery found *      Plan:     During this hospitalization, patient does not require further acute PT services.  Please re-consult if situation changes.      Subjective     Chief Complaint: stomach pain   Patient/Family Comments/goals: none stated  Pain/Comfort:  · Pain Rating 1: (pt did not rate numerically)  · Location - Side 1: Bilateral  · Location - Orientation 1: generalized  · Location 1: abdomen  · Pain Addressed 1: Distraction, Reposition    Patients cultural, spiritual, Hoahaoism conflicts given the current situation:      Living Environment:  Pt came from hospice, prior to that was living with a roommate in a [facility] with 5 ESME with BHR and a tub shower with a seat and grab bars.     Prior to admission, patients level of function was requiring assistance for ADLs, using w/c for mobility at hospice, t/f's via stand pivot, enjoys watching TV.  Equipment used at home: glucometer.  DME owned (not currently used): none.  Upon discharge, patient will have assistance from family/friends.    Objective:     Communicated with RN prior to session.  Patient found supine with telemetry upon PT entry to room.    General Precautions: Standard, fall   Orthopedic Precautions:N/A   Braces: N/A     Exams:  · Cognitive Exam:  Patient is oriented to Person, Place, Time and Situation  · RLE ROM: WFL  · RLE Strength: WFL  · LLE ROM: WFL  · LLE Strength: WFL    Functional Mobility:  · Bed  Mobility:     · Scooting: independence  · Supine to Sit: independence  · Sit to Supine: independence  · Transfers:     · Sit to Stand:  supervision with no AD  · Gait:   · 15ft with no AD and Supervision, appropriate gait speed, 0 LOB, impulsive behaviors causing decreased overall safety   · Balance:   · Sitting: Independent Standing: Supervision    AM-PAC 6 CLICK MOBILITY  Total Score:22       Therapeutic Activities and Exercises:    -Pt educated on:              -PT roles, expectations, and POC               -Safety with mobility              -Benefits of OOB activities to increase strength and functional mobility               -Performing ther ex for increasing LE ROM and strength              -Discharge recommendations       AM-PAC 6 CLICK MOBILITY  Total Score:22     Patient left supine with all lines intact, call button in reach and bed alarm on.    GOALS:   Multidisciplinary Problems     Physical Therapy Goals     Not on file          Multidisciplinary Problems (Resolved)        Problem: Physical Therapy Goal    Goal Priority Disciplines Outcome Goal Variances Interventions   Physical Therapy Goal   (Resolved)     PT, PT/OT Met                     History:     Past Medical History:   Diagnosis Date    Diabetes mellitus     Hepatocellular carcinoma 08/2019    Hypertension        History reviewed. No pertinent surgical history.    Time Tracking:     PT Received On: 09/24/19  PT Start Time: 1052     PT Stop Time: 1103  PT Total Time (min): 11 min     Billable Minutes: Evaluation 11      Jo-Ann Patterson PT  09/24/2019

## 2019-09-24 NOTE — ASSESSMENT & PLAN NOTE
Patient prescribed metformin prior to entering hospice.     - order A1C  - place on LDSS  - NPO at this time, will need diabetic/salt restricted diet

## 2019-09-24 NOTE — SUBJECTIVE & OBJECTIVE
"Past Medical History:   Diagnosis Date    Diabetes mellitus     Hepatocellular carcinoma 08/2019    Hypertension        History reviewed. No pertinent surgical history.    Review of patient's allergies indicates:  No Known Allergies    No current facility-administered medications on file prior to encounter.      No current outpatient medications on file prior to encounter.     Family History     None        Tobacco Use    Smoking status: Former Smoker    Smokeless tobacco: Never Used   Substance and Sexual Activity    Alcohol use: Not Currently    Drug use: Not Currently    Sexual activity: Not on file     Review of Systems   Constitutional: Negative for chills, diaphoresis, fatigue and fever.   Eyes: Negative.    Respiratory: Negative for cough and shortness of breath.    Cardiovascular: Negative for chest pain and leg swelling.   Gastrointestinal: Positive for abdominal distention, abdominal pain ("discomfort"), constipation and nausea (after morphine).   Genitourinary: Negative.    Musculoskeletal: Negative.    Neurological: Negative.    Psychiatric/Behavioral: Negative.      Objective:     Vital Signs (Most Recent):  Temp: 99.2 °F (37.3 °C) (09/23/19 1851)  Pulse: 88 (09/23/19 2026)  Resp: 16 (09/23/19 2026)  BP: (!) 163/77 (09/23/19 2026)  SpO2: 99 % (09/23/19 1851) Vital Signs (24h Range):  Temp:  [98.9 °F (37.2 °C)-99.2 °F (37.3 °C)] 99.2 °F (37.3 °C)  Pulse:  [81-88] 88  Resp:  [16-18] 16  SpO2:  [98 %-99 %] 99 %  BP: (148-185)/(67-77) 163/77     Weight: 71.2 kg (157 lb)  Body mass index is 26.95 kg/m².    Physical Exam   Constitutional: She is oriented to person, place, and time. She appears well-developed.   Uncomfortable appearing older woman   HENT:   Head: Normocephalic and atraumatic.   Eyes: Pupils are equal, round, and reactive to light. Scleral icterus is present.   Cardiovascular: Normal rate, regular rhythm and normal heart sounds.   Pulmonary/Chest: Effort normal. No respiratory distress. " She has rales (bibasilar).   Tachypnea, shallow breaths   Abdominal: She exhibits distension. She exhibits no mass. There is no tenderness. There is no guarding.   Tense abdomen   Musculoskeletal: She exhibits edema (trace, bilateral LE).   Neurological: She is alert and oriented to person, place, and time.   Skin: Capillary refill takes 2 to 3 seconds. She is not diaphoretic.   Psychiatric: She has a normal mood and affect. Her behavior is normal.   Nursing note and vitals reviewed.        CRANIAL NERVES     CN III, IV, VI   Pupils are equal, round, and reactive to light.     Significant Labs: All pertinent labs within the past 24 hours have been reviewed.    Significant Imaging: I have reviewed all pertinent imaging results/findings within the past 24 hours.

## 2019-09-24 NOTE — MEDICAL/APP STUDENT
"                       [facility] Medicine                                                         History and Physical       Team: [facility] 5 Daniel Rader   Admit Date: 9/23/2019   HOD: 1     LEE: 9/26/2019     Primary care Physician: Primary Doctor No    Principal Problem: Ascites         HPI:       Ms. Anali Sidhu is a 64 year old lady with recently diagnosed HCC (August 2019) who presents with rapid distention of her abdomen over the last week, consistent with ascites.     She does not have pain--she only describes fullness, constipation, and discomfort while taking deep breaths.      [facility] in [place] is where she receives majority of care.    Palliative oncology wrote in the note that they were unable to provide palliative chemo, but recommended Hospice care. However,  declined further hospice care due to bad experience.     This morning 2.1 liters of ascites fluid was taken out via Paracentesis, with cultures being sent to check for SBP. After the fluid was removed, a painless mass was noted between the right hypochondrium and epigastrium. She had never noticed it before.     Completed orders  40 mg furosemide  Zofran 4 mg   Morphine 4 mg    Currently hospital meds  Ceftriaxone - 1 gram  Spironoloactone -25mg  Lactulose 20 gram   Enoxaparin - 40 mg        Hemoglobin A1C   Date Value Ref Range Status   09/23/2019 4.3 4.0 - 5.6 % Final     Comment:     ADA Screening Guidelines:  5.7-6.4%  Consistent with prediabetes  >or=6.5%  Consistent with diabetes  High levels of fetal hemoglobin interfere with the HbA1C  assay. Heterozygous hemoglobin variants (HbS, HgC, etc)do  not significantly interfere with this assay.   However, presence of multiple variants may affect accuracy.     09/23/2019 4.4 4.0 - 5.6 % Final     Comment:     ADA Screening Guidelines:  5.7-6.4%  Consistent with " prediabetes  >or=6.5%  Consistent with diabetes  High levels of fetal hemoglobin interfere with the HbA1C  assay. Heterozygous hemoglobin variants (HbS, HgC, etc)do  not significantly interfere with this assay.   However, presence of multiple variants may affect accuracy.             Review of Systems:  Pain Scale: 1 /10   Constitutional: no fever or chills  Respiratory: no cough or shortness of breath  Cardiovascular: Chest discomfort if she is very active  Gastrointestinal: Nausea & vomiting controlled on Zofran. Severe Constipation  Genitourinary: no hematuria or dysuria  Integument/Breast: no rash or pruritis  Hematologic/Lymphatic: no easy bruising or lymphadenopathy  Musculoskeletal: no arthralgias or myalgias  Neurological: no seizures or tremors  Behavioral/Psych: no depression or anxiety      PAST HISTORY:     Past Medical History:   Diagnosis Date    Diabetes mellitus     Hepatocellular carcinoma 08/2019    Hypertension        History reviewed. No pertinent surgical history.    History reviewed. No pertinent family history.    Social History     Socioeconomic History    Marital status: Single     Spouse name: Not on file    Number of children: Not on file    Years of education: Not on file    Highest education level: Not on file   Occupational History    Not on file   Social Needs    Financial resource strain: Not on file    Food insecurity:     Worry: Not on file     Inability: Not on file    Transportation needs:     Medical: Not on file     Non-medical: Not on file   Tobacco Use    Smoking status: Former Smoker    Smokeless tobacco: Never Used   Substance and Sexual Activity    Alcohol use: Not Currently    Drug use: Not Currently    Sexual activity: Not on file   Lifestyle    Physical activity:     Days per week: Not on file     Minutes per session: Not on file    Stress: Not on file   Relationships    Social connections:     Talks on phone: Not on file     Gets together: Not on file  "    Attends Faith service: Not on file     Active member of club or organization: Not on file     Attends meetings of clubs or organizations: Not on file     Relationship status: Not on file   Other Topics Concern    Not on file   Social History Narrative    Not on file         MEDICATIONS and ALLERGIES:   Reviewed    No current facility-administered medications on file prior to encounter.      No current outpatient medications on file prior to encounter.        Review of patient's allergies indicates:  No Known Allergies        PHYSICAL EXAM:     Vital Sign Min/Max (last 24 hours)     Value Min Max   Temp 97.2 °F (36.2 °C) 99.2 °F (37.3 °C)   Pulse 73 88   Resp 16 22Abnormal    BP: Systolic 125 185Abnormal    BP: Diastolic 60 79   MAP (mmHg) 86 111   SpO2 92 %Abnormal  99 %            Intake/Output Summary (Last 24 hours) at 9/24/2019 0850  Last data filed at 9/24/2019 0050  Gross per 24 hour   Intake 125 ml   Output 575 ml   Net -450 ml       General appearance: Mildly distressed,mildly lethargic, "cotton mouth" and dry lips  Mental status: Alert and oriented x 3  HEENT:  Mild icterus  Neck: supple, thyroid not enlarged  Pulm:   normal respiratory effort  Card: S2 & S2 present with no murmurs. Mild bilateral pitting edema  Abd: Severe generalized distention, unable to perform superficial & deep palpation.  Pulses: 2+, symmetric  Skin: Dry & cracked lips, No Spider Angioma, No narayanan redness.   Neuro: Cranial Nerves grossly intact, no focal numbness or weakness, normal strength and tone       LABS and IMAGING:       Recent Results (from the past 24 hour(s))   Comprehensive metabolic panel    Collection Time: 09/23/19  7:16 PM   Result Value Ref Range    Sodium 139 136 - 145 mmol/L    Potassium 3.4 (L) 3.5 - 5.1 mmol/L    Chloride 101 95 - 110 mmol/L    CO2 23 23 - 29 mmol/L    Glucose 59 (L) 70 - 110 mg/dL    BUN, Bld 7 (L) 8 - 23 mg/dL    Creatinine 0.7 0.5 - 1.4 mg/dL    Calcium 9.3 8.7 - 10.5 mg/dL    " Total Protein 8.1 6.0 - 8.4 g/dL    Albumin 3.1 (L) 3.5 - 5.2 g/dL    Total Bilirubin 13.8 (H) 0.1 - 1.0 mg/dL    Alkaline Phosphatase 288 (H) 55 - 135 U/L     (H) 10 - 40 U/L    ALT 59 (H) 10 - 44 U/L    Anion Gap 15 8 - 16 mmol/L    eGFR if African American >60.0 >60 mL/min/1.73 m^2    eGFR if non African American >60.0 >60 mL/min/1.73 m^2   CBC auto differential    Collection Time: 09/23/19  7:16 PM   Result Value Ref Range    WBC 9.79 3.90 - 12.70 K/uL    RBC 3.97 (L) 4.00 - 5.40 M/uL    Hemoglobin 8.0 (L) 12.0 - 16.0 g/dL    Hematocrit 27.0 (L) 37.0 - 48.5 %    Mean Corpuscular Volume 68 (L) 82 - 98 fL    Mean Corpuscular Hemoglobin 20.2 (L) 27.0 - 31.0 pg    Mean Corpuscular Hemoglobin Conc 29.6 (L) 32.0 - 36.0 g/dL    RDW 37.2 (H) 11.5 - 14.5 %    Platelets 529 (H) 150 - 350 K/uL    MPV SEE COMMENT 9.2 - 12.9 fL    Immature Granulocytes 1.5 (H) 0.0 - 0.5 %    Gran # (ANC) 7.7 1.8 - 7.7 K/uL    Immature Grans (Abs) 0.15 (H) 0.00 - 0.04 K/uL    Lymph # 1.3 1.0 - 4.8 K/uL    Mono # 0.6 0.3 - 1.0 K/uL    Eos # 0.1 0.0 - 0.5 K/uL    Baso # 0.03 0.00 - 0.20 K/uL    nRBC 0 0 /100 WBC    Gran% 78.7 (H) 38.0 - 73.0 %    Lymph% 12.9 (L) 18.0 - 48.0 %    Mono% 5.8 4.0 - 15.0 %    Eosinophil% 0.8 0.0 - 8.0 %    Basophil% 0.3 0.0 - 1.9 %    Platelet Estimate Increased (A)     Aniso Moderate     Poik Moderate     Poly Occasional     Hypo Occasional     Target Cells Occasional     Tear Drop Cells Occasional     Mitchell Cells Occasional     Differential Method Automated    Urinalysis, Reflex to Urine Culture Urine, Clean Catch    Collection Time: 09/23/19  7:16 PM   Result Value Ref Range    Specimen UA Urine, Clean Catch     Color, UA Nikki Yellow, Straw, Nikki    Appearance, UA Clear Clear    pH, UA 5.0 5.0 - 8.0    Specific Gravity, UA 1.020 1.005 - 1.030    Protein, UA 1+ (A) Negative    Glucose, UA Negative Negative    Ketones, UA 1+ (A) Negative    Bilirubin (UA) 2+ (A) Negative    Occult Blood UA Negative Negative     Nitrite, UA Negative Negative    Leukocytes, UA Negative Negative   Lipase    Collection Time: 09/23/19  7:16 PM   Result Value Ref Range    Lipase 24 4 - 60 U/L   Magnesium    Collection Time: 09/23/19  7:16 PM   Result Value Ref Range    Magnesium 2.1 1.6 - 2.6 mg/dL   APTT    Collection Time: 09/23/19  7:16 PM   Result Value Ref Range    aPTT 21.8 21.0 - 32.0 sec   Protime-INR    Collection Time: 09/23/19  7:16 PM   Result Value Ref Range    Prothrombin Time 14.3 (H) 9.0 - 12.5 sec    INR 1.4 (H) 0.8 - 1.2   Urinalysis Microscopic    Collection Time: 09/23/19  7:16 PM   Result Value Ref Range    RBC, UA 2 0 - 4 /hpf    WBC, UA 3 0 - 5 /hpf    Bacteria Few (A) None-Occ /hpf    Squam Epithel, UA 11 /hpf    Hyaline Casts, UA 0 0-1/lpf /lpf    Microscopic Comment SEE COMMENT    Sodium, urine, random    Collection Time: 09/23/19  7:16 PM   Result Value Ref Range    Sodium Urine Random 38 20 - 250 mmol/L   Hemoglobin A1c    Collection Time: 09/23/19  7:16 PM   Result Value Ref Range    Hemoglobin A1C 4.4 4.0 - 5.6 %    Estimated Avg Glucose 80 68 - 131 mg/dL   Ammonia    Collection Time: 09/23/19  7:25 PM   Result Value Ref Range    Ammonia 41 10 - 50 umol/L   Hemoglobin A1c    Collection Time: 09/23/19  7:25 PM   Result Value Ref Range    Hemoglobin A1C 4.3 4.0 - 5.6 %    Estimated Avg Glucose 77 68 - 131 mg/dL   POCT glucose    Collection Time: 09/23/19  9:44 PM   Result Value Ref Range    POCT Glucose 60 (L) 70 - 110 mg/dL   POCT glucose    Collection Time: 09/23/19 10:15 PM   Result Value Ref Range    POCT Glucose 146 (H) 70 - 110 mg/dL   Comprehensive Metabolic Panel (CMP)    Collection Time: 09/24/19  5:42 AM   Result Value Ref Range    Sodium 139 136 - 145 mmol/L    Potassium 3.4 (L) 3.5 - 5.1 mmol/L    Chloride 101 95 - 110 mmol/L    CO2 23 23 - 29 mmol/L    Glucose 57 (L) 70 - 110 mg/dL    BUN, Bld 7 (L) 8 - 23 mg/dL    Creatinine 0.7 0.5 - 1.4 mg/dL    Calcium 9.1 8.7 - 10.5 mg/dL    Total Protein 7.1 6.0  - 8.4 g/dL    Albumin 2.7 (L) 3.5 - 5.2 g/dL    Total Bilirubin 12.0 (H) 0.1 - 1.0 mg/dL    Alkaline Phosphatase 259 (H) 55 - 135 U/L     (H) 10 - 40 U/L    ALT 50 (H) 10 - 44 U/L    Anion Gap 15 8 - 16 mmol/L    eGFR if African American >60.0 >60 mL/min/1.73 m^2    eGFR if non African American >60.0 >60 mL/min/1.73 m^2   Magnesium    Collection Time: 09/24/19  5:42 AM   Result Value Ref Range    Magnesium 1.8 1.6 - 2.6 mg/dL   Phosphorus    Collection Time: 09/24/19  5:42 AM   Result Value Ref Range    Phosphorus 1.5 (L) 2.7 - 4.5 mg/dL   PT/INR    Collection Time: 09/24/19  5:42 AM   Result Value Ref Range    Prothrombin Time 14.7 (H) 9.0 - 12.5 sec    INR 1.5 (H) 0.8 - 1.2   CBC auto differential    Collection Time: 09/24/19  5:42 AM   Result Value Ref Range    WBC 8.29 3.90 - 12.70 K/uL    RBC 3.63 (L) 4.00 - 5.40 M/uL    Hemoglobin 7.3 (L) 12.0 - 16.0 g/dL    Hematocrit 24.0 (L) 37.0 - 48.5 %    Mean Corpuscular Volume 66 (L) 82 - 98 fL    Mean Corpuscular Hemoglobin 20.1 (L) 27.0 - 31.0 pg    Mean Corpuscular Hemoglobin Conc 30.4 (L) 32.0 - 36.0 g/dL    RDW 37.0 (H) 11.5 - 14.5 %    Platelets 466 (H) 150 - 350 K/uL    MPV SEE COMMENT 9.2 - 12.9 fL   CBC auto differential    Collection Time: 09/24/19  5:42 AM   Result Value Ref Range    WBC 8.20 3.90 - 12.70 K/uL    RBC 3.69 (L) 4.00 - 5.40 M/uL    Hemoglobin 7.4 (L) 12.0 - 16.0 g/dL    Hematocrit 24.4 (L) 37.0 - 48.5 %    Mean Corpuscular Volume 66 (L) 82 - 98 fL    Mean Corpuscular Hemoglobin 20.1 (L) 27.0 - 31.0 pg    Mean Corpuscular Hemoglobin Conc 30.3 (L) 32.0 - 36.0 g/dL    RDW 37.2 (H) 11.5 - 14.5 %    Platelets 460 (H) 150 - 350 K/uL    MPV SEE COMMENT 9.2 - 12.9 fL   POCT glucose    Collection Time: 09/24/19  8:34 AM   Result Value Ref Range    POCT Glucose 54 (L) 70 - 110 mg/dL       Recent Labs   Lab 09/23/19  2144 09/23/19  2215 09/24/19  0834   POCTGLUCOSE 60* 146* 54*     Most Recent Labs 9/24/2019    H/H  7.3/24  MCV 66  Platelets  466    WBC- 8.29    PT Time - 14.7    Sodium 139  Potassium 3.4  BUN 7  Glucose in Chem profile -57    Hepatic panel  -Alk phos 259  -Albumin 2.7  Total Bili 12  -  -ALT  - 50    Ultrasound imaging 9/24/2019  Mass in the left hepatic lobe measuring 8.8 x 7.2 x 4.9 cm.    There is clot in the left portal vein with reversal of flow.  There is partial clot in the right portal vein and main portal vein with diminished flow.    Hepatomegaly.    Mild volume ascites.    Gallbladder sludge.            Active Hospital Problems    Diagnosis  POA    *Ascites [R18.8]  Yes    HCC (hepatocellular carcinoma) [C22.0]  Yes    Hypertension [I10]  Unknown    Type 2 diabetes mellitus, without long-term current use of insulin [E11.9]  Yes      Resolved Hospital Problems   No resolved problems to display.           ASSESSMENT and PLAN:     Problems List:  Active Hospital Problems    Diagnosis  POA    *Ascites [R18.8]  Yes    HCC (hepatocellular carcinoma) [C22.0]  Yes    Hypertension [I10]  Unknown    Type 2 diabetes mellitus, without long-term current use of insulin [E11.9]  Yes      Resolved Hospital Problems   No resolved problems to display.      is our 64 year old lady with PMH of DMII, HTN, and recently diagnosed HCC(August 2019) who presents with a week long history of rapid abdominal distention and constipation, consistent with ascites.    Plan    Ascites- Continue with Furosemide Spironolactone. Therapeutic paracentesis.    HCC- Hepatology will check in on patient tomorrow. Calculate TANIKA score, and consult with transplant team for further evaluation.      Mass- Possibly buildup of stool in her Colon -- Docusate sodium    Diabetes- Trend pocket glucose. Order A1C    HTN- Medications on hold as requested by hospice care.     VTE Prophylaxis- 40 mg enoxaparin injection      Daniel Rader  Medical Student Year III

## 2019-09-24 NOTE — PLAN OF CARE
09/24/19 1129   Discharge Assessment   Assessment Type Discharge Planning Assessment   Confirmed/corrected address and phone number on facesheet? Yes   Assessment information obtained from? Patient   Communicated expected length of stay with patient/caregiver no   Prior to hospitilization cognitive status: Alert/Oriented   Prior to hospitalization functional status: Assistive Equipment   Current cognitive status: Alert/Oriented   Current Functional Status: Assistive Equipment   Facility Arrived From: Home   Lives With other relative(s)  (Lives with Great neice)   Able to Return to Prior Arrangements yes   Is patient able to care for self after discharge? Yes   Who are your caregiver(s) and their phone number(s)?   (Missy Lutz ([place]) [phone])   Patient's perception of discharge disposition home or selfcare   Readmission Within the Last 30 Days no previous admission in last 30 days   Patient currently being followed by outpatient case management? No   Patient currently receives any other outside agency services? No   Equipment Currently Used at Home glucometer   Do you have any problems affording any of your prescribed medications? No   Is the patient taking medications as prescribed? yes   Does the patient have transportation home? Yes   Transportation Anticipated family or friend will provide   Does the patient receive services at the [facility]madin Clinic? No   Discharge Plan A Home with family;Hospice/home   Discharge Plan B Home with family   DME Needed Upon Discharge  none   Patient/Family in Agreement with Plan yes

## 2019-09-24 NOTE — PLAN OF CARE
Pt remains free from falls/injury. No acute events during shift. VSS, NAD. Bed in lowest position, wheels locked, side rails up times 2, call light w/in reach, bed alarm on, family at bedside. Room clear of obstacles.       Pt BG monitored AC/HS as per order. Pt covered with insulin aspart per low sliding scale. No S&S of hypo/hyperglycemia noticed. Pt with low BG in am, improved throughout the shift.      No skin breakdown noticed. Pt repositioned independently. Ambulates to bathroom independently with stand by assist.       Pt c/o abdominal pain and nausea this shift. PRN meds administered as per order. WCTM.

## 2019-09-24 NOTE — ED NOTES
Patient identifiers verified and correct for Anali Sidhu.    LOC: The patient is awake, alert and aware of environment with an appropriate affect, the patient is oriented x 3 and speaking appropriately.    APPEARANCE: Patient resting comfortably and in no acute distress, patient is clean and well groomed, patient's clothing is properly fastened.    SKIN: The skin is warm and dry, color consistent with ethnicity, patient has normal skin turgor and moist mucus membranes, skin intact, no breakdown or bruising noted.    MUSCULOSKELETAL: Patient moving all extremities spontaneously, no obvious swelling or deformities noted.    RESPIRATORY: Airway is open and patent, respirations are spontaneous, patient has a normal effort and rate, no accessory muscle use noted, bilateral breath sounds clear  CARDIAC: Patient has a normal rate and regular rhythm, no periphreal edema noted, capillary refill < 3 seconds.    ABDOMEN: Round( ascities) , bowel sounds present     NEUROLOGIC: PERRLA, 3 mm bilaterally, eyes open spontaneously, Yellow sclera. behavior appropriate to situation, follows commands, facial expression symmetrical, bilateral hand grasp equal and even, purposeful motor response noted, normal sensation in all extremities when touched with a finger.

## 2019-09-24 NOTE — ASSESSMENT & PLAN NOTE
Patient on no medications as was on hospice.     Will start on furosemide and spironolactone. Monitor for need for additional antihypertensives.

## 2019-09-24 NOTE — HPI
Ms Soto is a 64 year old female with HTN, NIDDM2 and a recent diagnosis of HCC diagnosed in August 2019, who transitioned to hospice with [facility] presenting for abdominal distension and discomfort. Patient reports that over the past week she has had rapid distension of her abdomen, with mild abdominal pain described as a fullness, and constipation. Patient presents with her niece who states she saw patient last week and her abdomen was not this full. Patient reports that she was to get additional medications to help with her abdomen, but they never arrived due to never being sent by the hospice company. Patient is a poor historian otherwise.     Much of her workup had been completed at [facility] in , by palliative on 8/29. She states that she would not like to return to hospice at this time, as she states she understood it was to make her comfortable, but she has not been feeling that way. Patient is open to further conversations with palliative care in the future.

## 2019-09-24 NOTE — CONSULTS
Palliative Care Consult.    Consult received. Chart reviewed.  Discussed pt with IM5 team who requested consult be placed on hold.  Will recheck with team tomorrow.   Thank you for the consult..    Imelda MCDONNELL, ACNS-BC, ACHPN

## 2019-09-24 NOTE — PT/OT/SLP EVAL
Occupational Therapy   Evaluation and Discharge Note    Name: Anali Sidhu  MRN: [id number]  Admitting Diagnosis:  Ascites      Recommendations:     Discharge Recommendations: home  Discharge Equipment Recommendations:  none  Barriers to discharge:  None    Assessment:     Anali Sidhu is a 64 y.o. female with a medical diagnosis of Ascites. At this time, patient is functioning at their prior level of function and does not require further acute OT services.     Plan:     During this hospitalization, patient does not require further acute OT services.  Please re-consult if situation changes.    · Plan of Care Reviewed with: patient    Subjective     Chief Complaint: Fatigue  Patient/Family Comments/goals: Medical management and discharge.       Occupational Profile:  Living Environment: Pt came from hospice, prior to that was living with a roommate in a H with 5 ESME with BHR and a tub shower with a seat and grab bars.  Previous level of function: moderate assistance for adls/iadls.  Roles and Routines: likes watching tv.   Equipment Used at home:  glucometer  Assistance upon Discharge: Yes, from family/friends.     Pain/Comfort:  · Pain Rating 1: other (see comments)(did not quantify pain in abdomen)  · Location - Side 1: Bilateral  · Location - Orientation 1: generalized  · Location 1: abdomen  · Pain Addressed 1: Reposition, Distraction  · Pain Rating Post-Intervention 1: other (see comments)(no change)    Patients cultural, spiritual, Taoist conflicts given the current situation: no    Objective:     Communicated with: Rn prior to session.  Patient found right sidelying with   upon OT entry to room.    General Precautions: Standard, fall   Orthopedic Precautions:N/A   Braces: N/A     Occupational Performance:    Bed Mobility:    · Patient completed Rolling/Turning to Left with  modified independence  · Patient completed Rolling/Turning to Right with modified independence  · Patient completed  Scooting/Bridging with modified independence  · Patient completed Supine to Sit with modified independence  · Patient completed Sit to Supine with modified independence    Functional Mobility/Transfers:  · Patient completed Sit <> Stand Transfer with modified independence  with  hand-held assist   · Patient completed Toilet Transfer Step Transfer technique with modified independence with  hand-held assist  · Functional Mobility: Able to walk around the room w/ supervision for impulsivity.     Activities of Daily Living:  · Upper Body Dressing: supervision seated EOB, w/ setup  · Lower Body Dressing: supervision seated EOB, w/ setup  · Toileting: independence seated on toilet    Cognitive/Visual Perceptual:  Completely cognitively intact adult w/ no glasses worn or present during eval.     Physical Exam:  Physically capable to resume normal activities of daily living.       AMPAC 6 Click ADL:  AMPAC Total Score: 24    Treatment & Education:  -Pt edu on OT role/POC  -Importance of OOB activity with staff assistance  -Safety during functional t/f and mobility  - White board updated  - Multiple self care tasks/functional mobility completed-- assistance level noted above  - All questions/concerns answered within OT scope of practice    Education:    Patient left HOB elevated with all lines intact, call button in reach and RN notified    GOALS:   Multidisciplinary Problems     Occupational Therapy Goals     Not on file          Multidisciplinary Problems (Resolved)        Problem: Occupational Therapy Goal    Goal Priority Disciplines Outcome Interventions   Occupational Therapy Goal   (Resolved)     OT, PT/OT Met                    History:     Past Medical History:   Diagnosis Date    Diabetes mellitus     Hepatocellular carcinoma 08/2019    Hypertension        History reviewed. No pertinent surgical history.    Time Tracking:     OT Date of Treatment: 09/24/19  OT Start Time: 1053  OT Stop Time: 1102  OT Total Time  (min): 9 min    Billable Minutes:Self Care/Home Management 9 mins    Salomón Tim OT  9/24/2019

## 2019-09-24 NOTE — PROCEDURES
"Anali Sidhu is a 64 y.o. female patient.    Temp: 99.1 °F (37.3 °C) (19 1255)  Pulse: 82 (19 1255)  Resp: 18 (19 1255)  BP: (!) 156/67 (19 1255)  SpO2: 98 % (19 1255)  Weight: 71.7 kg (158 lb 1.1 oz) (19 0015)  Height: 5' 4" (162.6 cm) (19 1643)       Paracentesis  Date/Time: 2019 3:01 PM  Performed by: Jacques Maravilla DO  Authorized by: Jacques Maravilla DO   Assisting provider: Mitchell Landa MD  Pre-operative diagnosis: Ascites  Post-operative diagnosis: Ascites  Consent Done: Yes  Consent: Verbal consent obtained. Written consent obtained.  Consent given by: patient  Patient understanding: patient states understanding of the procedure being performed  Patient consent: the patient's understanding of the procedure matches consent given  Procedure consent: procedure consent matches procedure scheduled  Relevant documents: relevant documents present and verified  Test results: test results available and properly labeled  Site marked: the operative site was marked  Patient identity confirmed: , MRN, name and verbally with patient  Time out: Immediately prior to procedure a "time out" was called to verify the correct patient, procedure, equipment, support staff and site/side marked as required.  Initial or subsequent exam: initial  Procedure purpose: diagnostic and therapeutic  Indications: abdominal discomfort secondary to ascites  Anesthesia: local infiltration    Anesthesia:  Local anesthesia used: yes  Local Anesthetic: lidocaine 1% without epinephrine  Patient sedated: no  Preparation: Patient was prepped and draped in the usual sterile fashion.  Ultrasound guidance: yes  Puncture site: right lower quadrant  Fluid removed: 2100(ml)  Fluid appearance: serous  Complications: No  Estimated blood loss (mL): 1  Specimens: No  Implants: No  Patient tolerance: Patient tolerated the procedure well with no immediate complications          Jacques Maravilla" DO  9/24/2019

## 2019-09-24 NOTE — PLAN OF CARE
09/24/19 0921   Post-Acute Status   Post-Acute Authorization Home Health/Hospice   Home Health/Hospice Status Awaiting Internal Medical Clearance

## 2019-09-24 NOTE — ASSESSMENT & PLAN NOTE
Patient with new onset ascites, recent HCC diagnosis 8/2019. Reports rapid accumulation of ascites over 1 week's time.     PLAN  - trial IV 40mg furosemide once. Assess output and schedule/titrate as necessary  - start spironolactone, titrate upwards as needed  - will likely need therapeutic paracentesis  - 1g ceftriaxone daily for SBP prophylaxis  - NPO at this time, will need diabetic/salt restricted diet

## 2019-09-24 NOTE — NURSING
BG=54. Pt asymptomatic. Was NPO for US Liver. IM-5 at bedside and aware. Glucose tabs administered per PRN order. WCTM.

## 2019-09-25 LAB
ALBUMIN SERPL BCP-MCNC: 2.6 G/DL (ref 3.5–5.2)
ALP SERPL-CCNC: 251 U/L (ref 55–135)
ALT SERPL W/O P-5'-P-CCNC: 55 U/L (ref 10–44)
ANION GAP SERPL CALC-SCNC: 16 MMOL/L (ref 8–16)
ANISOCYTOSIS BLD QL SMEAR: ABNORMAL
AST SERPL-CCNC: 320 U/L (ref 10–40)
BASOPHILS # BLD AUTO: 0.04 K/UL (ref 0–0.2)
BASOPHILS NFR BLD: 0.5 % (ref 0–1.9)
BILIRUB SERPL-MCNC: 10.9 MG/DL (ref 0.1–1)
BUN SERPL-MCNC: 8 MG/DL (ref 8–23)
CALCIUM SERPL-MCNC: 9.4 MG/DL (ref 8.7–10.5)
CHLORIDE SERPL-SCNC: 104 MMOL/L (ref 95–110)
CO2 SERPL-SCNC: 18 MMOL/L (ref 23–29)
CREAT SERPL-MCNC: 0.7 MG/DL (ref 0.5–1.4)
DIFFERENTIAL METHOD: ABNORMAL
EOSINOPHIL # BLD AUTO: 0.1 K/UL (ref 0–0.5)
EOSINOPHIL NFR BLD: 1.7 % (ref 0–8)
ERYTHROCYTE [DISTWIDTH] IN BLOOD BY AUTOMATED COUNT: 37.6 % (ref 11.5–14.5)
EST. GFR  (AFRICAN AMERICAN): >60 ML/MIN/1.73 M^2
EST. GFR  (NON AFRICAN AMERICAN): >60 ML/MIN/1.73 M^2
GLUCOSE SERPL-MCNC: 58 MG/DL (ref 70–110)
HCT VFR BLD AUTO: 24.7 % (ref 37–48.5)
HGB BLD-MCNC: 7.4 G/DL (ref 12–16)
HYPOCHROMIA BLD QL SMEAR: ABNORMAL
IMM GRANULOCYTES # BLD AUTO: 0.11 K/UL (ref 0–0.04)
IMM GRANULOCYTES NFR BLD AUTO: 1.4 % (ref 0–0.5)
INR PPP: 1.7 (ref 0.8–1.2)
LYMPHOCYTES # BLD AUTO: 1.5 K/UL (ref 1–4.8)
LYMPHOCYTES NFR BLD: 20.1 % (ref 18–48)
MAGNESIUM SERPL-MCNC: 2 MG/DL (ref 1.6–2.6)
MCH RBC QN AUTO: 20.2 PG (ref 27–31)
MCHC RBC AUTO-ENTMCNC: 30 G/DL (ref 32–36)
MCV RBC AUTO: 67 FL (ref 82–98)
MONOCYTES # BLD AUTO: 0.7 K/UL (ref 0.3–1)
MONOCYTES NFR BLD: 8.5 % (ref 4–15)
NEUTROPHILS # BLD AUTO: 5.2 K/UL (ref 1.8–7.7)
NEUTROPHILS NFR BLD: 67.8 % (ref 38–73)
NRBC BLD-RTO: 0 /100 WBC
OVALOCYTES BLD QL SMEAR: ABNORMAL
PHOSPHATE SERPL-MCNC: 1.6 MG/DL (ref 2.7–4.5)
PLATELET # BLD AUTO: 455 K/UL (ref 150–350)
PMV BLD AUTO: ABNORMAL FL (ref 9.2–12.9)
POCT GLUCOSE: 116 MG/DL (ref 70–110)
POCT GLUCOSE: 116 MG/DL (ref 70–110)
POCT GLUCOSE: 81 MG/DL (ref 70–110)
POCT GLUCOSE: 99 MG/DL (ref 70–110)
POIKILOCYTOSIS BLD QL SMEAR: SLIGHT
POLYCHROMASIA BLD QL SMEAR: ABNORMAL
POTASSIUM SERPL-SCNC: 4.4 MMOL/L (ref 3.5–5.1)
PROT SERPL-MCNC: 6.9 G/DL (ref 6–8.4)
PROTHROMBIN TIME: 16.4 SEC (ref 9–12.5)
RBC # BLD AUTO: 3.67 M/UL (ref 4–5.4)
SODIUM SERPL-SCNC: 138 MMOL/L (ref 136–145)
SPHEROCYTES BLD QL SMEAR: ABNORMAL
TARGETS BLD QL SMEAR: ABNORMAL
WBC # BLD AUTO: 7.67 K/UL (ref 3.9–12.7)

## 2019-09-25 PROCEDURE — 85610 PROTHROMBIN TIME: CPT

## 2019-09-25 PROCEDURE — 80053 COMPREHEN METABOLIC PANEL: CPT

## 2019-09-25 PROCEDURE — 25000003 PHARM REV CODE 250: Performed by: STUDENT IN AN ORGANIZED HEALTH CARE EDUCATION/TRAINING PROGRAM

## 2019-09-25 PROCEDURE — 63600175 PHARM REV CODE 636 W HCPCS: Performed by: HOSPITALIST

## 2019-09-25 PROCEDURE — 83735 ASSAY OF MAGNESIUM: CPT

## 2019-09-25 PROCEDURE — 20600001 HC STEP DOWN PRIVATE ROOM

## 2019-09-25 PROCEDURE — 85025 COMPLETE CBC W/AUTO DIFF WBC: CPT

## 2019-09-25 PROCEDURE — 25000003 PHARM REV CODE 250: Performed by: HOSPITALIST

## 2019-09-25 PROCEDURE — 63600175 PHARM REV CODE 636 W HCPCS: Performed by: STUDENT IN AN ORGANIZED HEALTH CARE EDUCATION/TRAINING PROGRAM

## 2019-09-25 PROCEDURE — 94761 N-INVAS EAR/PLS OXIMETRY MLT: CPT

## 2019-09-25 PROCEDURE — 84100 ASSAY OF PHOSPHORUS: CPT

## 2019-09-25 PROCEDURE — 99232 SBSQ HOSP IP/OBS MODERATE 35: CPT | Mod: ,,, | Performed by: HOSPITALIST

## 2019-09-25 PROCEDURE — 99232 PR SUBSEQUENT HOSPITAL CARE,LEVL II: ICD-10-PCS | Mod: ,,, | Performed by: HOSPITALIST

## 2019-09-25 PROCEDURE — 36415 COLL VENOUS BLD VENIPUNCTURE: CPT

## 2019-09-25 RX ORDER — HYDROMORPHONE HYDROCHLORIDE 1 MG/ML
1 INJECTION, SOLUTION INTRAMUSCULAR; INTRAVENOUS; SUBCUTANEOUS EVERY 6 HOURS PRN
Status: DISCONTINUED | OUTPATIENT
Start: 2019-09-25 | End: 2019-09-26 | Stop reason: HOSPADM

## 2019-09-25 RX ORDER — MAGNESIUM SULFATE HEPTAHYDRATE 40 MG/ML
2 INJECTION, SOLUTION INTRAVENOUS ONCE
Status: DISCONTINUED | OUTPATIENT
Start: 2019-09-25 | End: 2019-09-25

## 2019-09-25 RX ORDER — LACTULOSE 10 G/15ML
20 SOLUTION ORAL 3 TIMES DAILY
Status: DISCONTINUED | OUTPATIENT
Start: 2019-09-25 | End: 2019-09-26 | Stop reason: HOSPADM

## 2019-09-25 RX ORDER — DEXAMETHASONE 1 MG/1
2 TABLET ORAL EVERY 8 HOURS
Status: DISCONTINUED | OUTPATIENT
Start: 2019-09-25 | End: 2019-09-26 | Stop reason: HOSPADM

## 2019-09-25 RX ORDER — HYDROXYZINE PAMOATE 25 MG/1
25 CAPSULE ORAL EVERY 8 HOURS PRN
Status: DISCONTINUED | OUTPATIENT
Start: 2019-09-25 | End: 2019-09-26 | Stop reason: HOSPADM

## 2019-09-25 RX ORDER — HYDROMORPHONE HYDROCHLORIDE 2 MG/1
2 TABLET ORAL
Status: DISCONTINUED | OUTPATIENT
Start: 2019-09-25 | End: 2019-09-26 | Stop reason: HOSPADM

## 2019-09-25 RX ORDER — FUROSEMIDE 10 MG/ML
40 INJECTION INTRAMUSCULAR; INTRAVENOUS DAILY
Status: DISCONTINUED | OUTPATIENT
Start: 2019-09-25 | End: 2019-09-25

## 2019-09-25 RX ORDER — HYDROMORPHONE HYDROCHLORIDE 1 MG/ML
0.5 INJECTION, SOLUTION INTRAMUSCULAR; INTRAVENOUS; SUBCUTANEOUS ONCE
Status: COMPLETED | OUTPATIENT
Start: 2019-09-25 | End: 2019-09-25

## 2019-09-25 RX ORDER — FUROSEMIDE 20 MG/1
20 TABLET ORAL DAILY
Status: DISCONTINUED | OUTPATIENT
Start: 2019-09-25 | End: 2019-09-26 | Stop reason: HOSPADM

## 2019-09-25 RX ORDER — FENTANYL 50 UG/1
1 PATCH TRANSDERMAL
Status: DISCONTINUED | OUTPATIENT
Start: 2019-09-25 | End: 2019-09-26 | Stop reason: HOSPADM

## 2019-09-25 RX ORDER — MORPHINE SULFATE 2 MG/ML
2 INJECTION, SOLUTION INTRAMUSCULAR; INTRAVENOUS EVERY 4 HOURS PRN
Status: DISCONTINUED | OUTPATIENT
Start: 2019-09-25 | End: 2019-09-25

## 2019-09-25 RX ORDER — HYDROMORPHONE HYDROCHLORIDE 1 MG/ML
0.5 INJECTION, SOLUTION INTRAMUSCULAR; INTRAVENOUS; SUBCUTANEOUS EVERY 6 HOURS PRN
Status: DISCONTINUED | OUTPATIENT
Start: 2019-09-25 | End: 2019-09-25

## 2019-09-25 RX ADMIN — HYDROMORPHONE HYDROCHLORIDE 2 MG: 2 TABLET ORAL at 09:09

## 2019-09-25 RX ADMIN — ONDANSETRON 4 MG: 2 INJECTION INTRAMUSCULAR; INTRAVENOUS at 09:09

## 2019-09-25 RX ADMIN — HYDROMORPHONE HYDROCHLORIDE 0.5 MG: 1 INJECTION, SOLUTION INTRAMUSCULAR; INTRAVENOUS; SUBCUTANEOUS at 09:09

## 2019-09-25 RX ADMIN — HYDROXYZINE PAMOATE 25 MG: 25 CAPSULE ORAL at 10:09

## 2019-09-25 RX ADMIN — DEXAMETHASONE 2 MG: 1 TABLET ORAL at 09:09

## 2019-09-25 RX ADMIN — FUROSEMIDE 20 MG: 20 TABLET ORAL at 12:09

## 2019-09-25 RX ADMIN — DEXAMETHASONE 2 MG: 1 TABLET ORAL at 03:09

## 2019-09-25 RX ADMIN — LACTULOSE 20 G: 20 SOLUTION ORAL at 03:09

## 2019-09-25 RX ADMIN — HYDROMORPHONE HYDROCHLORIDE 1 MG: 1 INJECTION, SOLUTION INTRAMUSCULAR; INTRAVENOUS; SUBCUTANEOUS at 10:09

## 2019-09-25 RX ADMIN — HYDROMORPHONE HYDROCHLORIDE 1 MG: 1 INJECTION, SOLUTION INTRAMUSCULAR; INTRAVENOUS; SUBCUTANEOUS at 03:09

## 2019-09-25 RX ADMIN — ENOXAPARIN SODIUM 40 MG: 100 INJECTION SUBCUTANEOUS at 05:09

## 2019-09-25 RX ADMIN — SODIUM PHOSPHATE, MONOBASIC, MONOHYDRATE 39.99 MMOL: 276; 142 INJECTION, SOLUTION INTRAVENOUS at 09:09

## 2019-09-25 RX ADMIN — LACTULOSE 20 G: 20 SOLUTION ORAL at 09:09

## 2019-09-25 RX ADMIN — SPIRONOLACTONE 25 MG: 25 TABLET, FILM COATED ORAL at 09:09

## 2019-09-25 RX ADMIN — FENTANYL 1 PATCH: 50 PATCH, EXTENDED RELEASE TRANSDERMAL at 05:09

## 2019-09-25 NOTE — MEDICAL/APP STUDENT
Subjective  Ms. Anali Sidhu is a 64 year old lady with recently diagnosed HCC (August 2019) who presents with rapid distention of her abdomen over the last week, consistent with ascites.   Yesterday she had 2.1 liters of ascites fluid taken out via paracentesis, cultures came back negative for SBP.     Overnight  She developed rapid abdominal distention again with severe discomfort and pressure. When I saw her this morning she was tossing and turning, extremely uncomfortable having also not getting any sleep last night. The on-call doctor saw her overnight and provided Oxycodone, but it has not helped at all.       ROS  Positive for discomfort and inability to pass stool.   All other ROS negative, including no fevers,chills or nights weats.    Currently Receiving  Enoxaparin 40  Hydromorphone for the pain which was started this morning   Potassium chloride BID  Sodium phosphate in dextrose  Spironolactone    Objective    Vital Sign Min/Max (last 24 hours)     Value Min Max   Temp 97.9 °F (36.6 °C) 99.4 °F (37.4 °C)   Pulse 76 82   Resp 16 18   BP: Systolic 129 156Abnormal    BP: Diastolic 59Abnormal  67   MAP (mmHg) 85 97   SpO2 96 % 98 %     Physical Exam   Constitutional: She is oriented to person, place, and time. She appears distressed.   Eyes: Scleral icterus is present.   Cardiovascular: Normal rate, regular rhythm and normal heart sounds. Exam reveals no gallop.   No murmur heard.  Pulmonary/Chest: Breath sounds normal. She has no wheezes.   Abdominal: She exhibits distension. There is tenderness. There is no rebound and no guarding.   Musculoskeletal: She exhibits no edema, tenderness or deformity.   Neurological: She is alert and oriented to person, place, and time.   Skin: Skin is dry.     Labs 9/25  All lab results were reviewed, the pertinent notable ones :    H/H 7.4/24.7  platelets 455  PTtime 16.4  INR 1.7  Potassium 4.4 (increase from the 3.4 yesterday)  Alk Phos 251  Albumin 2.6  Bilirubin  10.9    ALT 55      Assessment  Ms. Anali Sidhu is a 64 year old lady with recently diagnosed HCC (August 2019) who presents with rapid distention of her abdomen over the last week, consistent with ascites. She had another episode of rapidly progressing ascites fluid and abdominal distention.    Plan  -dilaudid ordered  -Pain monitoring  -Telemetry   -Pericentesis & drainage placement  -Continue with Furosamide, Spironolactone, and lactulose  -Hepatology will be visiting today      Daniel Rader  Medical Student Year III

## 2019-09-25 NOTE — PROGRESS NOTES
[facility] Hospital Medicine  Progress Note    Patient Name: Anali Sidhu  MRN: [id number]  Patient Class: IP- Inpatient   Admission Date: 9/23/2019  Length of Stay: 2 days  Attending Physician: Nora Agosto MD  Primary Care Provider: Primary Doctor [facility] Medicine Team: [facility] HOSP MED 5 Mitchell Landa MD    Subjective:     Principal Problem:Ascites    HPI:  Ms Soto is a 64 year old female with HTN, NIDDM2 and a recent diagnosis of HCC diagnosed in August 2019, who transitioned to hospice with [facility] presenting for abdominal distension and discomfort. Patient reports that over the past week she has had rapid distension of her abdomen, with mild abdominal pain described as a fullness, and constipation. Patient presents with her niece who states she saw patient last week and her abdomen was not this full. Patient reports that she was to get additional medications to help with her abdomen, but they never arrived due to never being sent by the hospice company. Patient is a poor historian otherwise.     Much of her workup had been completed at [facility] [place] by palliative on 8/29. She states that she would not like to return to hospice at this time, as she states she understood it was to make her comfortable, but she has not been feeling that way. Patient is open to further conversations with palliative care in the future.     Overview/Hospital Course:  Pt presenting with new onset ascites. While inpatient a paracentesis was performed and 2.5L of serous fluid removed. No SBP however the patient reaccumulated within 24 hours with worsening abdominal pain. Heptatology was consulted. Per chart review from the outside hospital, the patient has possible mets to her hip from her HCC which was biopsy confirmed. Hepatology consulted. No palliative therapy due to mets. Patient was evaluated by heme onc at OSH and was deemed unable to tolerate chemo. IR consulted for  "palliative drain placement.     Interval History:     NAEON. Patient with worsening abdominal pain and worsening abdominal distension. Had a BM with lactulose.     Review of Systems   Constitutional: Negative for chills, diaphoresis, fatigue and fever.   Eyes: Negative.    Respiratory: Negative for cough and shortness of breath.    Cardiovascular: Negative for chest pain and leg swelling.   Gastrointestinal: Positive for abdominal distention, abdominal pain ("discomfort"), constipation and nausea (after morphine).   Genitourinary: Negative.    Musculoskeletal: Negative.    Neurological: Negative.    Psychiatric/Behavioral: Negative.      Objective:     Vital Signs (Most Recent):  Temp: 98.4 °F (36.9 °C) (09/25/19 0751)  Pulse: 76 (09/25/19 0751)  Resp: 18 (09/25/19 0751)  BP: (!) 129/59 (09/25/19 0751)  SpO2: 97 % (09/25/19 0751) Vital Signs (24h Range):  Temp:  [97.9 °F (36.6 °C)-99.4 °F (37.4 °C)] 98.4 °F (36.9 °C)  Pulse:  [76-82] 76  Resp:  [16-18] 18  SpO2:  [96 %-98 %] 97 %  BP: (129-156)/(59-67) 129/59     Weight: 67.4 kg (148 lb 9.4 oz)  Body mass index is 25.51 kg/m².    Intake/Output Summary (Last 24 hours) at 9/25/2019 0933  Last data filed at 9/25/2019 0414  Gross per 24 hour   Intake 120 ml   Output --   Net 120 ml      Physical Exam   Constitutional: She is oriented to person, place, and time. She appears well-developed.   Uncomfortable appearing older woman   HENT:   Head: Normocephalic and atraumatic.   Eyes: Pupils are equal, round, and reactive to light. Scleral icterus is present.   Cardiovascular: Normal rate, regular rhythm and normal heart sounds.   Pulmonary/Chest: Effort normal. No respiratory distress. She has rales (bibasilar).   Tachypnea, shallow breaths   Abdominal: She exhibits distension. She exhibits no mass. There is no tenderness. There is no guarding.   Tense abdomen   Musculoskeletal: She exhibits edema (trace, bilateral LE).   Neurological: She is alert and oriented to person, " place, and time.   Skin: Capillary refill takes 2 to 3 seconds. She is not diaphoretic.   Psychiatric: She has a normal mood and affect. Her behavior is normal.   Nursing note and vitals reviewed.    Significant Labs:   BMP:   Recent Labs   Lab 09/25/19  0407   GLU 58*      K 4.4      CO2 18*   BUN 8   CREATININE 0.7   CALCIUM 9.4   MG 2.0     CBC:   Recent Labs   Lab 09/23/19  1916 09/24/19  0542 09/25/19  0407   WBC 9.79 8.20  8.29 7.67   HGB 8.0* 7.4*  7.3* 7.4*   HCT 27.0* 24.4*  24.0* 24.7*   * 460*  466* 455*       Assessment/Plan:      * Ascites  Patient with new onset ascites, recent HCC diagnosis 8/2019. Reports rapid accumulation of ascites over 1 week's time.     PLAN  - Will continue Lasix 40mg daily.   - paracentesis on 9/24 with -2.5L removed. No SBP (cefriaxone discontinued).   - Worsening abdominal pain with reaccumulation today. Will beside US to measure any worsening ascities fluid. May possibly need drain with IR.   - Hepatology consulted. No palliative treatment at this time. Patient to resume hospice.     Type 2 diabetes mellitus, without long-term current use of insulin  Patient prescribed metformin prior to entering hospice.     - order A1C  - place on LDSS  - NPO at this time, will need diabetic/salt restricted diet      Hypertension  Patient on no medications as was on hospice.     Will start on furosemide and spironolactone. Monitor for need for additional antihypertensives.     HCC (hepatocellular carcinoma)  Patient diagnosed with R and L lobe hepatocellular carcinoma in August 2019 and was told she is not a candidate for palliative chemotherapy. Patient's care was at [facility]. Rescinded hospice to present 09/25/2019.    PLAN:   - Outside hospital records showing 1 single large lesion HCC confirmed by biopsy with possible mets to her hip.   - Hepatology consulted for any further palliative treatments. Recs appreciated.       VTE Risk Mitigation (From  admission, onward)         Ordered     enoxaparin injection 40 mg  Daily      09/23/19 2134     IP VTE HIGH RISK PATIENT  Once      09/23/19 2134              Mitchell Landa MD  Department of Hospital Medicine   [facility]

## 2019-09-25 NOTE — ASSESSMENT & PLAN NOTE
Patient diagnosed with R and L lobe hepatocellular carcinoma in August 2019 and was told she is not a candidate for palliative chemotherapy. Patient's care was at [facility]. Rescinded hospice to present 09/25/2019.    Patient rescinded hospice ([facility]) to present to hospital. Palliative medicine consulted, call in AM. Patient stated she does understand hospice, but did not get the care for comfort as she expected.     PLAN:   - Outside hospital records showing 1 single large lesion HCC confirmed by biopsy with possible mets to her hip.   - Hepatology consulted for any further palliative treatments. Recs appreciated.

## 2019-09-25 NOTE — PLAN OF CARE
09/25/19 1428   Post-Acute Status   Post-Acute Authorization Home Health/Hospice   Home Health/Hospice Status Referrals Sent     Referral to [facility] provided through [facility].

## 2019-09-25 NOTE — PLAN OF CARE
Problem: Adult Inpatient Plan of Care  Goal: Plan of Care Review  9/25/2019 1817 by Radha Muñoz RN  Flowsheets (Taken 9/25/2019 1817)  Plan of Care Reviewed With: patient; family  9/25/2019 1816 by Radha Muñoz RN  Outcome: Ongoing, Progressing        Pt remains free from falls/injury. No acute events during shift. VSS, NAD. Bed in lowest position, wheels locked, side rails up times 2, call light w/in reach, bed alarm on, family at bedside. Room clear of obstacles.       Pt BG monitored AC/HS as per order. Pt covered with insulin aspart per low sliding scale. No S&S of hypo/hyperglycemia noticed.      No skin breakdown noticed. Pt repositioned independently. Ambulates to bathroom independently with stand by assist.       Pt c/o abdominal pain and nausea this shift. PRN meds administered as per order. WCTM.

## 2019-09-25 NOTE — SUBJECTIVE & OBJECTIVE
"Interval History:     NAEON. Patient with worsening abdominal pain and worsening abdominal distension. Had a BM with lactulose.     Review of Systems   Constitutional: Negative for chills, diaphoresis, fatigue and fever.   Eyes: Negative.    Respiratory: Negative for cough and shortness of breath.    Cardiovascular: Negative for chest pain and leg swelling.   Gastrointestinal: Positive for abdominal distention, abdominal pain ("discomfort"), constipation and nausea (after morphine).   Genitourinary: Negative.    Musculoskeletal: Negative.    Neurological: Negative.    Psychiatric/Behavioral: Negative.      Objective:     Vital Signs (Most Recent):  Temp: 98.4 °F (36.9 °C) (09/25/19 0751)  Pulse: 76 (09/25/19 0751)  Resp: 18 (09/25/19 0751)  BP: (!) 129/59 (09/25/19 0751)  SpO2: 97 % (09/25/19 0751) Vital Signs (24h Range):  Temp:  [97.9 °F (36.6 °C)-99.4 °F (37.4 °C)] 98.4 °F (36.9 °C)  Pulse:  [76-82] 76  Resp:  [16-18] 18  SpO2:  [96 %-98 %] 97 %  BP: (129-156)/(59-67) 129/59     Weight: 67.4 kg (148 lb 9.4 oz)  Body mass index is 25.51 kg/m².    Intake/Output Summary (Last 24 hours) at 9/25/2019 0933  Last data filed at 9/25/2019 0414  Gross per 24 hour   Intake 120 ml   Output --   Net 120 ml      Physical Exam   Constitutional: She is oriented to person, place, and time. She appears well-developed.   Uncomfortable appearing older woman   HENT:   Head: Normocephalic and atraumatic.   Eyes: Pupils are equal, round, and reactive to light. Scleral icterus is present.   Cardiovascular: Normal rate, regular rhythm and normal heart sounds.   Pulmonary/Chest: Effort normal. No respiratory distress. She has rales (bibasilar).   Tachypnea, shallow breaths   Abdominal: She exhibits distension. She exhibits no mass. There is no tenderness. There is no guarding.   Tense abdomen   Musculoskeletal: She exhibits edema (trace, bilateral LE).   Neurological: She is alert and oriented to person, place, and time.   Skin: Capillary " refill takes 2 to 3 seconds. She is not diaphoretic.   Psychiatric: She has a normal mood and affect. Her behavior is normal.   Nursing note and vitals reviewed.    Significant Labs:   BMP:   Recent Labs   Lab 09/25/19  0407   GLU 58*      K 4.4      CO2 18*   BUN 8   CREATININE 0.7   CALCIUM 9.4   MG 2.0     CBC:   Recent Labs   Lab 09/23/19  1916 09/24/19  0542 09/25/19  0407   WBC 9.79 8.20  8.29 7.67   HGB 8.0* 7.4*  7.3* 7.4*   HCT 27.0* 24.4*  24.0* 24.7*   * 460*  466* 455*

## 2019-09-25 NOTE — NURSING
Patient complaints of severe abdominal pressure and pain feels as if it has increased in the last 2 hours.  Heat packs applied. Provided zofran and morphine iv. There is an increase in abdominal distension pain is all over but mostly in right lower quadrant puncture site.  Dr. Daniel with im5 notified and coming to bedside.    Assisted patient with siting on side of bed rocking back and forth and standing with walker. She has belched once but no relief from pain is moving and moaning wanted to get back in bed.

## 2019-09-25 NOTE — PLAN OF CARE
09/25/19 1028   Post-Acute Status   Post-Acute Authorization Other   Other Status No Post-Acute Service Needs

## 2019-09-25 NOTE — CONSULTS
Radiology Consult    Anali Sidhu is a 64 y.o. female with metastatic HCC and recurrent ascites for which IR is consulted to place a pleurx drain prior to patient returning to hospice.  Per primary team, a paracentesis was performed yesterday, draining 2.5L of fluid.       Past Medical History:   Diagnosis Date    Diabetes mellitus     Hepatocellular carcinoma 08/2019    Hypertension      History reviewed. No pertinent surgical history.      Scheduled Meds:    dexAMETHasone  2 mg Oral Q8H    enoxaparin  40 mg Subcutaneous Daily    fentaNYL  1 patch Transdermal Q72H    furosemide  20 mg Oral Daily    lactulose  20 g Oral TID    spironolactone  25 mg Oral Daily     Continuous Infusions:   PRN Meds:bisacodyl, Dextrose 10% Bolus, Dextrose 10% Bolus, glucagon (human recombinant), glucose, glucose, HYDROmorphone, HYDROmorphone, hydrOXYzine pamoate, insulin aspart U-100, ondansetron, prochlorperazine, sodium chloride 0.9%    Allergies: Review of patient's allergies indicates:  No Known Allergies    Labs:  Recent Labs   Lab 09/25/19  0407   INR 1.7*       Recent Labs   Lab 09/25/19  0407   WBC 7.67   HGB 7.4*   HCT 24.7*   MCV 67*   *      Recent Labs   Lab 09/25/19  0407   GLU 58*      K 4.4      CO2 18*   BUN 8   CREATININE 0.7   CALCIUM 9.4   MG 2.0   ALT 55*   *   ALBUMIN 2.6*   BILITOT 10.9*         Vitals (Most Recent):  Temp: 98.5 °F (36.9 °C) (09/25/19 1145)  Pulse: 77 (09/25/19 1145)  Resp: 18 (09/25/19 1145)  BP: 135/61 (09/25/19 1145)  SpO2: (!) 94 % (09/25/19 1145)    Plan:     IR consulted to for pleurx catheter for recurrent ascites in this patient on hospice.     Patient underwent a paracentesis yesterday so likely does not have enough ascites to safely perform drain placement at this time.  Recommend obtaining a limited abdominal US tomorrow 09/26 to assess the volume of fluid, and we will review the images.         Funmilayo Costello MD   Department of Radiology  PGY  III Resident  Pager: [phone]

## 2019-09-25 NOTE — CONSULTS
Palliative Care Consult Follow-uo    Discussed pt with Dr. Landa, IM5.  Deferred palliative medicine consult for now.     Please re-consult palliative medicine service if we can be of further assistance to the patient and team.    Imelda MCDONNELL, ACNS-BC, ACHPN

## 2019-09-25 NOTE — NURSING
Puncture site to RLQ abdomen from para performed yesterday leaking clear yellow fluid. ABD pad applied, saturated in 30 min. Urostomy pouch applied to puncture site to collect fluid. WCTM.

## 2019-09-25 NOTE — ASSESSMENT & PLAN NOTE
Patient with new onset ascites, recent HCC diagnosis 8/2019. Reports rapid accumulation of ascites over 1 week's time.     PLAN  - Will continue Lasix 40mg daily.   - paracentesis on 9/24 with -2.5L removed. No SBP (cefriaxone discontinued).   - Worsening abdominal pain with reaccumulation today. Will beside US to measure any worsening ascities fluid. May possibly need drain with IR.

## 2019-09-26 VITALS
DIASTOLIC BLOOD PRESSURE: 70 MMHG | WEIGHT: 148.56 LBS | HEART RATE: 74 BPM | OXYGEN SATURATION: 98 % | HEIGHT: 64 IN | RESPIRATION RATE: 17 BRPM | TEMPERATURE: 98 F | SYSTOLIC BLOOD PRESSURE: 155 MMHG | BODY MASS INDEX: 25.36 KG/M2

## 2019-09-26 LAB
ALBUMIN SERPL BCP-MCNC: 2.8 G/DL (ref 3.5–5.2)
ALP SERPL-CCNC: 272 U/L (ref 55–135)
ALT SERPL W/O P-5'-P-CCNC: 63 U/L (ref 10–44)
ANION GAP SERPL CALC-SCNC: 13 MMOL/L (ref 8–16)
AST SERPL-CCNC: 347 U/L (ref 10–40)
BASOPHILS # BLD AUTO: 0.01 K/UL (ref 0–0.2)
BASOPHILS NFR BLD: 0.1 % (ref 0–1.9)
BILIRUB SERPL-MCNC: 11.9 MG/DL (ref 0.1–1)
BUN SERPL-MCNC: 8 MG/DL (ref 8–23)
CALCIUM SERPL-MCNC: 9.4 MG/DL (ref 8.7–10.5)
CHLORIDE SERPL-SCNC: 103 MMOL/L (ref 95–110)
CO2 SERPL-SCNC: 24 MMOL/L (ref 23–29)
CREAT SERPL-MCNC: 0.7 MG/DL (ref 0.5–1.4)
DIFFERENTIAL METHOD: ABNORMAL
EOSINOPHIL # BLD AUTO: 0 K/UL (ref 0–0.5)
EOSINOPHIL NFR BLD: 0 % (ref 0–8)
ERYTHROCYTE [DISTWIDTH] IN BLOOD BY AUTOMATED COUNT: 38.6 % (ref 11.5–14.5)
EST. GFR  (AFRICAN AMERICAN): >60 ML/MIN/1.73 M^2
EST. GFR  (NON AFRICAN AMERICAN): >60 ML/MIN/1.73 M^2
GLUCOSE SERPL-MCNC: 127 MG/DL (ref 70–110)
HCT VFR BLD AUTO: 27.2 % (ref 37–48.5)
HGB BLD-MCNC: 7.9 G/DL (ref 12–16)
IMM GRANULOCYTES # BLD AUTO: 0.08 K/UL (ref 0–0.04)
IMM GRANULOCYTES NFR BLD AUTO: 1 % (ref 0–0.5)
INR PPP: 1.6 (ref 0.8–1.2)
LYMPHOCYTES # BLD AUTO: 1 K/UL (ref 1–4.8)
LYMPHOCYTES NFR BLD: 12.2 % (ref 18–48)
MAGNESIUM SERPL-MCNC: 2 MG/DL (ref 1.6–2.6)
MCH RBC QN AUTO: 20.1 PG (ref 27–31)
MCHC RBC AUTO-ENTMCNC: 29 G/DL (ref 32–36)
MCV RBC AUTO: 69 FL (ref 82–98)
MONOCYTES # BLD AUTO: 0.3 K/UL (ref 0.3–1)
MONOCYTES NFR BLD: 4 % (ref 4–15)
NEUTROPHILS # BLD AUTO: 6.5 K/UL (ref 1.8–7.7)
NEUTROPHILS NFR BLD: 82.7 % (ref 38–73)
NRBC BLD-RTO: 0 /100 WBC
PHOSPHATE SERPL-MCNC: 2.4 MG/DL (ref 2.7–4.5)
PLATELET # BLD AUTO: 468 K/UL (ref 150–350)
PMV BLD AUTO: ABNORMAL FL (ref 9.2–12.9)
POCT GLUCOSE: 136 MG/DL (ref 70–110)
POCT GLUCOSE: 146 MG/DL (ref 70–110)
POCT GLUCOSE: 168 MG/DL (ref 70–110)
POTASSIUM SERPL-SCNC: 4.1 MMOL/L (ref 3.5–5.1)
PROT SERPL-MCNC: 7.4 G/DL (ref 6–8.4)
PROTHROMBIN TIME: 15.8 SEC (ref 9–12.5)
RBC # BLD AUTO: 3.93 M/UL (ref 4–5.4)
SODIUM SERPL-SCNC: 140 MMOL/L (ref 136–145)
WBC # BLD AUTO: 7.92 K/UL (ref 3.9–12.7)

## 2019-09-26 PROCEDURE — 63600175 PHARM REV CODE 636 W HCPCS: Performed by: STUDENT IN AN ORGANIZED HEALTH CARE EDUCATION/TRAINING PROGRAM

## 2019-09-26 PROCEDURE — 63600175 PHARM REV CODE 636 W HCPCS: Performed by: HOSPITALIST

## 2019-09-26 PROCEDURE — 25000003 PHARM REV CODE 250: Performed by: STUDENT IN AN ORGANIZED HEALTH CARE EDUCATION/TRAINING PROGRAM

## 2019-09-26 PROCEDURE — 83735 ASSAY OF MAGNESIUM: CPT

## 2019-09-26 PROCEDURE — 80053 COMPREHEN METABOLIC PANEL: CPT

## 2019-09-26 PROCEDURE — 85025 COMPLETE CBC W/AUTO DIFF WBC: CPT

## 2019-09-26 PROCEDURE — 99239 HOSP IP/OBS DSCHRG MGMT >30: CPT | Mod: ,,, | Performed by: HOSPITALIST

## 2019-09-26 PROCEDURE — 84100 ASSAY OF PHOSPHORUS: CPT

## 2019-09-26 PROCEDURE — 85610 PROTHROMBIN TIME: CPT

## 2019-09-26 PROCEDURE — 99239 PR HOSPITAL DISCHARGE DAY,>30 MIN: ICD-10-PCS | Mod: ,,, | Performed by: HOSPITALIST

## 2019-09-26 PROCEDURE — 25000003 PHARM REV CODE 250: Performed by: HOSPITALIST

## 2019-09-26 PROCEDURE — 36415 COLL VENOUS BLD VENIPUNCTURE: CPT

## 2019-09-26 RX ORDER — HYDROMORPHONE HYDROCHLORIDE 8 MG/1
8 TABLET ORAL
Qty: 56 TABLET | Refills: 0 | Status: SHIPPED | OUTPATIENT
Start: 2019-09-26 | End: 2019-10-03

## 2019-09-26 RX ORDER — OXYCODONE HYDROCHLORIDE 10 MG/1
10 TABLET ORAL EVERY 4 HOURS PRN
Qty: 42 TABLET | Refills: 0 | Status: SHIPPED | OUTPATIENT
Start: 2019-09-26 | End: 2019-09-26 | Stop reason: HOSPADM

## 2019-09-26 RX ORDER — HYDROMORPHONE HYDROCHLORIDE 2 MG/1
8 TABLET ORAL
Qty: 56 TABLET | Refills: 0 | Status: SHIPPED | OUTPATIENT
Start: 2019-09-26 | End: 2019-09-26 | Stop reason: SDUPTHER

## 2019-09-26 RX ADMIN — FUROSEMIDE 20 MG: 20 TABLET ORAL at 09:09

## 2019-09-26 RX ADMIN — HYDROMORPHONE HYDROCHLORIDE 1 MG: 1 INJECTION, SOLUTION INTRAMUSCULAR; INTRAVENOUS; SUBCUTANEOUS at 04:09

## 2019-09-26 RX ADMIN — SPIRONOLACTONE 25 MG: 25 TABLET, FILM COATED ORAL at 09:09

## 2019-09-26 RX ADMIN — DEXAMETHASONE 2 MG: 1 TABLET ORAL at 05:09

## 2019-09-26 RX ADMIN — ONDANSETRON 4 MG: 2 INJECTION INTRAMUSCULAR; INTRAVENOUS at 10:09

## 2019-09-26 RX ADMIN — LACTULOSE 20 G: 20 SOLUTION ORAL at 09:09

## 2019-09-26 RX ADMIN — HYDROMORPHONE HYDROCHLORIDE 2 MG: 2 TABLET ORAL at 01:09

## 2019-09-26 NOTE — NURSING
urosotomy pouch remains in place to collect drainage post paracentesis; total of 450mL of yellow fluid removed from bag during this shift

## 2019-09-26 NOTE — PLAN OF CARE
Appointment scheduled for Dr. Blood palliative medicine in [place] for Oct. 2, 2019 @ 3:15 pm. Message left for IR to schedule f/u IR appt.

## 2019-09-26 NOTE — DISCHARGE SUMMARY
[facility] Hospital Medicine  Discharge Summary      Patient Name: Anali Sidhu  MRN: [id number]  Admission Date: 9/23/2019  Hospital Length of Stay: 3 days  Discharge Date and Time:  09/26/2019 1:47 PM  Attending Physician: Nora Agosto MD   Discharging Provider: Jacques Maravilla DO  Primary Care Provider: [facility] Medicine Team: [facility]OSP MED 5 Jacques Maravilla DO    HPI:   Ms Soto is a 64 year old female with HTN, NIDDM2 and a recent diagnosis of HCC diagnosed in August 2019, who transitioned to hospice with [facility] presenting for abdominal distension and discomfort. Patient reports that over the past week she has had rapid distension of her abdomen, with mild abdominal pain described as a fullness, and constipation. Patient presents with her niece who states she saw patient last week and her abdomen was not this full. Patient reports that she was to get additional medications to help with her abdomen, but they never arrived due to never being sent by the hospice company. Patient is a poor historian otherwise.     Much of her workup had been completed at [facility] in , by palliative on 8/29. She states that she would not like to return to hospice at this time, as she states she understood it was to make her comfortable, but she has not been feeling that way. Patient is open to further conversations with palliative care in the future.     * No surgery found *      Hospital Course:   The patient is a 65 yo F with HTN, Type II DM (not requiring insulin), and a recent diagnosis of HCC in August of 2019 who is presenting with new onset ascites. While inpatient a diagnostic/theraputic paracentesis was performed with 2.1 L of serous fluid removed on 09/24. SBP workup was negative, however the patient reaccumulated within 24 hours with worsening abdominal pain. Heptatology was consulted. Per chart review from the outside hospital, the patient has  possible mets to her hip from her HCC which was biopsy confirmed. IR was consulted for possible PleurX placement. Limited US was performed and did not show a pocket of fluid large enough to safely insert a PleurX at this time. Patient's pain and nausea/vomiting controlled with plans for discharge and close follow up with outpatient palliative care and IR. Patient refused hospice and home health as she is not interested in their services and recently fired her home hospice service with [facility].      Consults:   Consults (From admission, onward)        Status Ordering Provider     Inpatient consult to Interventional Radiology  Once     Provider:  (Not yet assigned)    Completed PASCUAL PELAYO     Inpatient consult to Palliative Care  Once     Provider:  (Not yet assigned)    Completed TY TERRY     Inpatient consult to PICC team (NIAS)  Once     Provider:  (Not yet assigned)    Completed GILA VILLAVICENCIO        Review of Systems   Constitutional: Positive for malaise/fatigue. Negative for chills, diaphoresis and fever.   HENT: Negative for congestion, nosebleeds and sinus pain.    Eyes: Negative for photophobia, pain and discharge.   Respiratory: Negative for cough, sputum production, shortness of breath and wheezing.    Cardiovascular: Negative for chest pain, palpitations, orthopnea and PND.   Gastrointestinal: Positive for abdominal pain (improving), constipation (imrpoving with lactulose) and nausea. Negative for diarrhea, heartburn and vomiting.   Genitourinary: Negative for dysuria, frequency, hematuria and urgency.   Musculoskeletal: Negative for back pain, joint pain and myalgias.   Skin: Negative for itching and rash.   Neurological: Negative for dizziness, tremors, seizures and headaches.     Vitals:    09/26/19 1210   BP: (!) 172/69   Pulse: 80   Resp: 17   Temp: 97.6 °F (36.4 °C)     Physical Exam   Constitutional: She is oriented to person, place, and time and well-developed,  well-nourished, and in no distress. No distress.   HENT:   Head: Normocephalic and atraumatic.   Right Ear: External ear normal.   Left Ear: External ear normal.   Nose: Nose normal.   Mouth/Throat: No oropharyngeal exudate.   Eyes: Pupils are equal, round, and reactive to light.   Neck: Normal range of motion. No JVD present. No tracheal deviation present. No thyromegaly present.   Cardiovascular: Normal rate, regular rhythm, normal heart sounds and intact distal pulses. Exam reveals no gallop and no friction rub.   No murmur heard.  Pulses:       Radial pulses are 2+ on the right side, and 2+ on the left side.   Pulmonary/Chest: Effort normal and breath sounds normal. No stridor. No respiratory distress. She has no wheezes. She has no rales.   Abdominal: Soft. Bowel sounds are normal. She exhibits distension (urostomy bag in place with drainage from paraentesis site- yellow serous drainage present, skin otherwise appears CDI). There is no tenderness. There is no rebound and no guarding.   Musculoskeletal: Normal range of motion. She exhibits no edema or deformity.   Neurological: She is alert and oriented to person, place, and time. No cranial nerve deficit.   Skin: Skin is warm and dry. No rash noted. She is not diaphoretic. No erythema.   Psychiatric: Affect and judgment normal.   Nursing note and vitals reviewed.          * Ascites  Patient with new onset ascites, recent HCC diagnosis 8/2019. Reports rapid accumulation of ascites over 1 week's time.     PLAN  - Will continue Lasix 40mg daily.   - paracentesis on 9/24 with -2.5L removed. No SBP (cefriaxone discontinued).   - Worsening abdominal pain with reaccumulation today. Will beside US to measure any worsening ascities fluid. May possibly need drain with IR.     Type 2 diabetes mellitus, without long-term current use of insulin  Patient prescribed metformin prior to entering hospice.     - order A1C  - place on LDSS  - NPO at this time, will need  diabetic/salt restricted diet      Hypertension  Patient on no medications as was on hospice.     Will start on furosemide and spironolactone. Monitor for need for additional antihypertensives.     HCC (hepatocellular carcinoma)  Patient diagnosed with R and L lobe hepatocellular carcinoma in August 2019 and was told she is not a candidate for palliative chemotherapy. Patient's care was at [facility]. Rescinded hospice to present 09/26/2019.    Patient rescinded hospice ([facility]) to present to hospital. Palliative medicine consulted, call in AM. Patient stated she does understand hospice, but did not get the care for comfort as she expected.     PLAN:   - Outside hospital records showing 1 single large lesion HCC confirmed by biopsy with possible mets to her hip.   - Hepatology consulted for any further palliative treatments. Recs appreciated.       Final Active Diagnoses:    Diagnosis Date Noted POA    PRINCIPAL PROBLEM:  Ascites [R18.8] 09/23/2019 Yes    HCC (hepatocellular carcinoma) [C22.0] 09/23/2019 Yes    Hypertension [I10] 09/23/2019 Unknown    Type 2 diabetes mellitus, without long-term current use of insulin [E11.9] 09/23/2019 Yes      Problems Resolved During this Admission:       Discharged Condition: fair    Disposition:     Follow Up:    Patient Instructions:      IR Pleural Drainage with tube placement   Standing Status: Future Standing Exp. Date: 09/26/20     Order Specific Question Answer Comments   Has the patient had a prior contrast or iodine reaction? No    Is the patient currently on any blood thinners like Aspirin, Coumadin, or Plavix? No    Is the patient on any Metformin drug, such as Glucophage or Glucovance? No        Significant Diagnostic Studies: Labs:   CMP   Recent Labs   Lab 09/25/19  0407 09/26/19  0517    140   K 4.4 4.1    103   CO2 18* 24   GLU 58* 127*   BUN 8 8   CREATININE 0.7 0.7   CALCIUM 9.4 9.4   PROT 6.9 7.4   ALBUMIN 2.6* 2.8*   BILITOT  10.9* 11.9*   ALKPHOS 251* 272*   * 347*   ALT 55* 63*   ANIONGAP 16 13   ESTGFRAFRICA >60.0 >60.0   EGFRNONAA >60.0 >60.0   , CBC   Recent Labs   Lab 09/25/19  0407 09/26/19  0517   WBC 7.67 7.92   HGB 7.4* 7.9*   HCT 24.7* 27.2*   * 468*    and All labs within the past 24 hours have been reviewed    Pending Diagnostic Studies:     None         Medications:  Reconciled Home Medications:      Medication List      START taking these medications    HYDROmorphone 8 MG tablet  Commonly known as:  DILAUDID  Take 1 tablet (8 mg total) by mouth every 3 (three) hours as needed for Pain.        CONTINUE taking these medications    dexAMETHasone 2 MG tablet  Commonly known as:  DECADRON  Take 2 mg by mouth 2 (two) times daily.     furosemide 40 MG tablet  Commonly known as:  LASIX  Take 40 mg by mouth once daily.     hydrocortisone 2.5 % rectal cream  Commonly known as:  ANUSOL-HC  Place rectally 2 (two) times daily as needed for Hemorrhoids.     hydrOXYzine pamoate 25 MG Cap  Commonly known as:  VISTARIL  Take 25-50 mg by mouth every 6 (six) hours as needed (itching).     lactulose 20 gram/30 mL Soln  Commonly known as:  CHRONULAC  Take 20 g by mouth 2 (two) times daily as needed (constipation).     OLANZapine 5 MG tablet  Commonly known as:  ZyPREXA  Take 5 mg by mouth every evening.     ondansetron 4 MG tablet  Commonly known as:  ZOFRAN  Take 4 mg by mouth every 8 (eight) hours.     spironolactone 100 MG tablet  Commonly known as:  ALDACTONE  Take 100 mg by mouth once daily.        STOP taking these medications    fentaNYL 50 mcg/hr  Commonly known as:  DURAGESIC     oxyCODONE 10 mg Tab immediate release tablet  Commonly known as:  ROXICODONE     polyethylene glycol 17 gram/dose powder  Commonly known as:  GLYCOLAX     prochlorperazine 10 MG tablet  Commonly known as:  COMPAZINE            Indwelling Lines/Drains at time of discharge:   Lines/Drains/Airways     Drain                 Drain/Device  09/25/19  Right lower abdomen other (see comments) 1 day                Time spent on the discharge of patient: 35 minutes  Patient was seen and examined on the date of discharge and determined to be suitable for discharge.         Jacques Maravilla DO  Department of Hospital Medicine  [facility]

## 2019-09-26 NOTE — MEDICAL/APP STUDENT
Ms. Anali Sidhu is a 64 year old lady with recently diagnosed HCC (August 2019) who presents with rapid distention of her abdomen over the last week, consistent with ascites.   On 9/24 she had 2.1 liters of ascites fluid taken out via paracentesis, cultures came back negative for SBP.   She had a drain placed yesterday evening.     Overnight  No adverse events, her vitals were stable. She had a bag drain placed yesterday evening, which drained about 400ml of ascites fluid overnight. She states she slept well and her daughter confirms that the dilaudid helped significantly. She also had a regular bowel movement yesterday evening.        ROS  Positive for the abdominal discomfort, although much less than yesterday.     All other ROS negative, including no fevers,chills or nights weats.     Currently Receiving  Dexamethasone 2mg q8  Enoxaparin 40 daily  Fentanyl transdermal over 72 hours  Potassium chloride BID  Sodium phosphate in dextrose  Spironolactone daily  Furosemide daily   Hydromorphone-once(9/25)  Lactulose daily     Objective     Vital Sign Min/Max (last 24 hours)     Value Min Max   Temp 98.1 °F (36.7 °C) 98.7 °F (37.1 °C)   Pulse 77 83   Resp 16 18   BP: Systolic 130 174Abnormal    BP: Diastolic 61 80   MAP (mmHg) 85 113   SpO2 94 %Abnormal       This morning her blood pressure was 176/77.         Physical Exam   Constitutional: She is oriented to person, place, and time. She appears comfortable  Eyes:  Mild scleral icterus is present.   Cardiovascular: Normal rate, regular rhythm and normal heart sounds. Exam reveals no gallop.   No murmur heard.  Pulmonary/Chest: Breath sounds normal. She has no wheezes.   Abdominal: She exhibits mild distention, significantly less than prior days.There is RUQ discomfort/pain on palpation. There is no rebound and no guarding.   Musculoskeletal: She exhibits no edema, tenderness or deformity.   Neurological: She is alert and oriented to person, place, and time.    Skin: Skin is dry.      Labs 9/26  All lab results were reviewed, the pertinent notable ones :     H/H 7.9/27.2  platelets 468  PTtime 15.8  INR 1.6    Electrolytes All within in normal range    Alk Phos 272  Albumin 2.8  Bilirubin 11.9    ALT 63        Assessment  Ms. Anali Sidhu is a 64 year old lady with recently diagnosed HCC (August 2019) who presents with rapid distention of her abdomen over the last week, consistent with ascites. She had another episode of rapidly progressing ascites fluid and abdominal distention.     Plan  -Telemetry   - Pain monitoring -Provide Dilaudid as needed  -Ultrasound ordered to inspect for further ascitic fluid   -Increase furosemide dose for her hypertension  -Continue Spironolactone, and lactulose  -Discharge planning -Social work sent a  Referral to [facility]         Daniel Rader  Medical Student Year III

## 2019-09-26 NOTE — PLAN OF CARE
09/26/19 1030   Post-Acute Status   Post-Acute Authorization Other   Other Status No Post-Acute Service Needs     Pt refusing home hospice or any hh services at d/c, [facility] aware of Pt and will follow Pt when she is home.

## 2019-09-26 NOTE — NURSING
Pt discharged with family member, ambulatory. Discharge instructions and medications reviewed. Pt verbalized understanding.

## 2019-09-26 NOTE — ASSESSMENT & PLAN NOTE
Patient diagnosed with R and L lobe hepatocellular carcinoma in August 2019 and was told she is not a candidate for palliative chemotherapy. Patient's care was at [facility]. Rescinded hospice to present 09/26/2019.    Patient rescinded [facility]ice ([facility]) to present to hospital. Palliative medicine consulted, call in AM. Patient stated she does understand hospice, but did not get the care for comfort as she expected.     PLAN:   - Outside hospital records showing 1 single large lesion HCC confirmed by biopsy with possible mets to her hip.   - Hepatology consulted for any further palliative treatments. Recs appreciated.

## 2019-09-26 NOTE — PLAN OF CARE
No significant changes or events overnight, AXOx4 with periods of brief forgetfulness and confusion, vitals stable for pt, BG WNL at HS, prn meds given for pain throughout the shift at pt request and pt slept intermittently a few hours, call light in reach, bed alarm used for safety, family at bedside for pt's comfort, will continue to monitor

## 2019-09-27 LAB — BACTERIA SPEC AEROBE CULT: NO GROWTH

## 2019-09-27 NOTE — PLAN OF CARE
D/C orders noted. Pt. Will d/c home with family. F/U appt. with palliative care scheduled.         09/27/19 0624   Final Note   Assessment Type Final Discharge Note   Anticipated Discharge Disposition Home   Hospital Follow Up  Appt(s) scheduled? Yes   Discharge plans and expectations educations in teach back method with documentation complete? Yes   Right Care Referral Info   Post Acute Recommendation No Care

## 2019-09-30 ENCOUNTER — TELEPHONE (OUTPATIENT)
Dept: HEPATOLOGY | Facility: CLINIC | Age: 64
End: 2019-09-30

## 2019-09-30 NOTE — TELEPHONE ENCOUNTER
----- Message from Lilli Ugarte sent at 9/27/2019  4:31 PM CDT -----  Contact: self  Type:   Patient states was discharged from hospital.   Patient states doctor called in  prescription to [facility]   Patient states was advised by pharmacy does not have diagnose code.    Patient states need prescription called into [facility]  Pharmacy on [address] in [place]  [phone]    Name of Caller:  Patient   When is the first available appointment?  Symptoms:  Best Call Back Number:[phone]  Additional Information: please call pt at [phone]

## 2019-09-30 NOTE — TELEPHONE ENCOUNTER
Pharmacy received dx code but insurance will only cover 28 pills for a seven day supply.   Pharmacy will fill 28 pills and patient will get a new script with a PA from her follow-up physician.

## 2019-10-01 LAB — BACTERIA SPEC ANAEROBE CULT: NORMAL

## 2020-01-18 NOTE — ED NOTES
Patient identifiers for Anali Sidhu 64 y.o. female checked and correct.  Chief Complaint   Patient presents with    Abdominal Pain     just found out liver cancer 2 months ago,  abd hard     No past medical history on file.  Allergies reported: Review of patient's allergies indicates:  No Known Allergies      LOC: Patient is awake, alert, and aware of environment with an appropriate affect. Patient is oriented x 3 and speaking appropriately.  APPEARANCE: Patient resting comfortably and in no acute distress. Patient is clean and well groomed, patient's clothing is properly fastened.  HEENT: jaundice  SKIN: The skin is warm and dry. Patient has normal skin turgor and moist mucus membranes. Skin is intact; no bruising or breakdown noted.  MUSKULOSKELETAL: Patient is moving all extremities well, no obvious deformities noted. Pulses intact.   RESPIRATORY: Airway is open and patent. Respirations are spontaneous and non-labored with normal effort and rate, BBS=clear  CARDIAC: Patient has a normal rate and rhythm. NSR on cardiac monitor,trace bilateral lower extremity peripheral edema noted.   ABDOMEN: abdominal distention and pain. Pt reports it is getting worse over the last 3 wks.  NEUROLOGICAL: PERRL. Facial expression is symmetrical. Hand grasps are equal bilaterally. Normal sensation in all extremities when touched with finger.           No